# Patient Record
Sex: MALE | Race: WHITE | Employment: UNEMPLOYED | URBAN - METROPOLITAN AREA
[De-identification: names, ages, dates, MRNs, and addresses within clinical notes are randomized per-mention and may not be internally consistent; named-entity substitution may affect disease eponyms.]

---

## 2017-01-12 ENCOUNTER — OFFICE VISIT (OUTPATIENT)
Dept: PAIN MANAGEMENT | Age: 42
End: 2017-01-12

## 2017-01-12 VITALS — DIASTOLIC BLOOD PRESSURE: 99 MMHG | SYSTOLIC BLOOD PRESSURE: 145 MMHG | HEART RATE: 81 BPM | RESPIRATION RATE: 17 BRPM

## 2017-01-12 DIAGNOSIS — M79.604 PAIN IN BOTH LOWER EXTREMITIES: ICD-10-CM

## 2017-01-12 DIAGNOSIS — G57.72 COMPLEX REGIONAL PAIN SYNDROME TYPE 2 OF LEFT LOWER EXTREMITY: ICD-10-CM

## 2017-01-12 DIAGNOSIS — Z79.899 ENCOUNTER FOR LONG-TERM (CURRENT) DRUG USE: ICD-10-CM

## 2017-01-12 DIAGNOSIS — M79.605 PAIN IN BOTH LOWER EXTREMITIES: ICD-10-CM

## 2017-01-12 DIAGNOSIS — R20.9 DISTURBANCE OF SKIN SENSATION: ICD-10-CM

## 2017-01-12 DIAGNOSIS — Z79.899 ENCOUNTER FOR LONG-TERM (CURRENT) USE OF MEDICATIONS: Primary | ICD-10-CM

## 2017-01-12 LAB
ALCOHOL UR POC: NORMAL
AMPHETAMINES UR POC: NEGATIVE
BARBITURATES UR POC: NEGATIVE
BENZODIAZEPINES UR POC: NEGATIVE
BUPRENORPHINE UR POC: NORMAL
CANNABINOIDS UR POC: NEGATIVE
CARISOPRODOL UR POC: NORMAL
COCAINE UR POC: NEGATIVE
FENTANYL UR POC: NORMAL
MDMA/ECSTASY UR POC: NEGATIVE
METHADONE UR POC: NEGATIVE
METHAMPHETAMINE UR POC: NEGATIVE
METHYLPHENIDATE UR POC: NEGATIVE
OPIATES UR POC: NEGATIVE
OXYCODONE UR POC: NEGATIVE
PHENCYCLIDINE UR POC: NEGATIVE
PROPOXYPHENE UR POC: NORMAL
TRAMADOL UR POC: NORMAL
TRICYCLICS UR POC: NEGATIVE

## 2017-01-12 RX ORDER — OXYMORPHONE HYDROCHLORIDE 30 MG/1
30 TABLET, FILM COATED, EXTENDED RELEASE ORAL EVERY 12 HOURS
Qty: 60 TAB | Refills: 0 | Status: SHIPPED | OUTPATIENT
Start: 2017-01-12 | End: 2017-01-12 | Stop reason: SDUPTHER

## 2017-01-12 RX ORDER — OXYMORPHONE HYDROCHLORIDE 10 MG/1
10-20 TABLET ORAL
Qty: 180 TAB | Refills: 0 | Status: SHIPPED | OUTPATIENT
Start: 2017-02-12 | End: 2017-03-20 | Stop reason: SDUPTHER

## 2017-01-12 RX ORDER — OXYMORPHONE HYDROCHLORIDE 10 MG/1
10-20 TABLET ORAL
Qty: 180 TAB | Refills: 0 | Status: SHIPPED | OUTPATIENT
Start: 2017-01-14 | End: 2017-01-12 | Stop reason: SDUPTHER

## 2017-01-12 RX ORDER — OXYMORPHONE HYDROCHLORIDE 30 MG/1
30 TABLET, FILM COATED, EXTENDED RELEASE ORAL EVERY 12 HOURS
Qty: 60 TAB | Refills: 0 | Status: SHIPPED | OUTPATIENT
Start: 2017-02-10 | End: 2017-03-12

## 2017-01-12 NOTE — PROGRESS NOTES
Nursing Notes    Patient presents to the office today in follow-up. Reviewed medications with counts as follows:    Rx Date filled Qty Dispensed Pill Count Last Dose Short   Oxymorphone 10 mg 12/16/16 180 9 Today. 5 tabs no   Oxymorphone er  12/16/16 60 0 today Yes. 5 tabs   Mr. Annalise Westfall has a reminder for a \"due or due soon\" health maintenance. I have asked that he contact his primary care provider for follow-up on this health maintenance. One day period is included in pill count. POC UDS was performed in office today. Preliminary UDS (-) for everything. Reports doses taken today. Any new labs or imaging since last appointment? NO    Have you been to an emergency room (ER) or urgent care clinic since your last visit? NO            Have you been hospitalized since your last visit? NO     If yes, where, when, and reason for visit? Have you seen or consulted any other health care providers outside of the [address] [place] [address]  since your last visit? NO     If yes, where, when, and reason for visit?

## 2017-01-12 NOTE — PATIENT INSTRUCTIONS
Plan:  Continue same medications as prescribed for chronic pain  Please do not tamper with extended release medications (Opana ER)  Do not self increase medications for any reason!  Failure to comply may result in discharge from the practice  Follow up in 2 months with  [facility]  Regular exercise and attention to emotional health and diet remain the most effective ways to treat chronic pain of all kinds  You may contact me with questions or concerns through [address]

## 2017-01-12 NOTE — PROGRESS NOTES
HISTORY OF PRESENT ILLNESS  Veronica Garcia is a 37 y.o. male. 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 Mr. Segundo Mina reports that foot pain levels have been stable since his last visit. He describes his left foot pain as throbbing, burning, tingling, and exquisitely tender to touch. Pain worsens considerably with prolonged walking, particularly on the calcaneous and forefoot, where thin-skin grafts have not healed satisfactorily over the past several years. He may undergo plastic and podiatric surgeries later this winter to repair these areas with skin and fat grafting to adequately pad the plantar aspect of the foot. Pt reports average of 4/10 pain scale most days. We discussed that he is again short on his medications, and also discussed his recent abberrencies on his last two UDS (abnormal urine concentration indicated possible dilution with water and positive amphetamines). This is a concerning pattern, as they have been accelerating over the past ten months. He denies any difficulty with controlling how he takes his medications, and denies taking any medications not prescribed by our practice. If his current doses are not adequately managing his symptoms, we need to consider rotating to a different class. He is apologetic and will make effort to take medications as prescribed. A total of 40 minutes was spent with the patient of which more than 50% of the time was spent counseling the patient. HPI--see above    ROS  Constitutional: Negative for fever, chills, weight loss and malaise/fatigue. HENT: Negative for congestion, sore throat and neck pain. Eyes: Negative for blurred vision and double vision. Respiratory: Negative for cough and shortness of breath. Smoker-almost 1 ppd   Cardiovascular: Negative for chest pain and leg swelling. Gastrointestinal: Positive for constipation. Negative for heartburn, nausea, vomiting and diarrhea. Genitourinary: Negative. Musculoskeletal: Negative for myalgias, back pain, joint pain and falls. Skin: Negative. Neurological: Positive for sensory change (burning left foot). Negative for dizziness, tingling, tremors, seizures and headaches. Difficulty walking   Physical Exam  Musculoskeletal:        Right wrist: He exhibits decreased range of motion, tenderness and swelling. He exhibits no effusion and no deformity. Swelling and tenderness overlying the extensor pollicis longus of the right hand. positive Finkelstein's maneuver on right      Constitutional: He is oriented to person, place, and time. He appears well-developed and well-nourished. No distress. HENT:   Head: Normocephalic. Right Ear: External ear normal.   Left Ear: External ear normal.   Eyes: Conjunctivae and EOM are normal. Pupils are equal, round, and reactive to light. Neck: Normal range of motion. Pulmonary/Chest: Effort normal. No respiratory distress. Musculoskeletal: Normal range of motion. Left foot: He exhibits tenderness and deformity. Feet:    Macerated area to bottom heel of left foot and just under first left toe (improved). No evidence of infection (advised to monitor)   Neurological: He is alert and oriented to person, place, and time. Gait (antalgic) abnormal.   Skin: Skin is warm and dry. Psychiatric: He has a normal mood and affect. His behavior is normal. Judgment and thought content normal.   ASSESSMENT and PLAN    ICD-10-CM ICD-9-CM    1. Encounter for long-term (current) use of medications Z79.899 V58.69 DRUG SCREEN      AMB POC DRUG SCREEN ()   2. Encounter for long-term (current) drug use Z79.899 V58.69    3. Complex regional pain syndrome type 2 of left lower extremity G57.72 355.71    4. Disturbance of skin sensation R20.9 782.0    5.  Pain in both lower extremities M79.604 729.5     M79.605        Plan:  Continue same medications as prescribed for chronic pain  Please do not tamper with extended release medications (Opana ER)  Do not self increase medications for any reason!  Failure to comply may result in discharge from the practice  Follow up in 2 months with Dr. Marvin Moya  Regular exercise and attention to emotional health and diet remain the most effective ways to treat chronic pain of all kinds  You may contact me with questions or concerns through [address]

## 2017-03-20 ENCOUNTER — OFFICE VISIT (OUTPATIENT)
Dept: PAIN MANAGEMENT | Age: 42
End: 2017-03-20

## 2017-03-20 VITALS
BODY MASS INDEX: 23.98 KG/M2 | SYSTOLIC BLOOD PRESSURE: 120 MMHG | HEIGHT: 72 IN | DIASTOLIC BLOOD PRESSURE: 79 MMHG | HEART RATE: 96 BPM | WEIGHT: 177 LBS

## 2017-03-20 DIAGNOSIS — Z79.899 ENCOUNTER FOR LONG-TERM (CURRENT) DRUG USE: ICD-10-CM

## 2017-03-20 DIAGNOSIS — M79.605 PAIN IN BOTH LOWER EXTREMITIES: ICD-10-CM

## 2017-03-20 DIAGNOSIS — M79.604 PAIN IN BOTH LOWER EXTREMITIES: ICD-10-CM

## 2017-03-20 DIAGNOSIS — G57.72 COMPLEX REGIONAL PAIN SYNDROME TYPE 2 OF LEFT LOWER EXTREMITY: Primary | ICD-10-CM

## 2017-03-20 DIAGNOSIS — G89.28 OTHER CHRONIC POSTOPERATIVE PAIN: ICD-10-CM

## 2017-03-20 DIAGNOSIS — K59.03 CONSTIPATION DUE TO PAIN MEDICATION: ICD-10-CM

## 2017-03-20 DIAGNOSIS — G89.4 CHRONIC PAIN SYNDROME: ICD-10-CM

## 2017-03-20 RX ORDER — OXYMORPHONE HYDROCHLORIDE 10 MG/1
10-20 TABLET ORAL
Qty: 180 TAB | Refills: 0 | Status: SHIPPED | OUTPATIENT
Start: 2017-03-20 | End: 2017-05-19 | Stop reason: SDUPTHER

## 2017-03-20 RX ORDER — OXYMORPHONE HYDROCHLORIDE 10 MG/1
10-20 TABLET ORAL
Qty: 180 TAB | Refills: 0 | Status: SHIPPED | OUTPATIENT
Start: 2017-03-20 | End: 2017-04-19

## 2017-03-20 RX ORDER — OXYMORPHONE HYDROCHLORIDE 30 MG/1
30 TABLET, FILM COATED, EXTENDED RELEASE ORAL EVERY 12 HOURS
Qty: 60 TAB | Refills: 0 | Status: CANCELLED | OUTPATIENT
Start: 2017-03-20 | End: 2017-04-19

## 2017-03-20 NOTE — MR AVS SNAPSHOT
Visit Information Date & Time Provider Department Dept. Phone Encounter #  
 3/20/2017  9:40 AM Justinjosephine Vick [facility] [phone] [id number] Follow-up Instructions Return in about 2 months (around 5/20/2017). Upcoming Health Maintenance Date Due Pneumococcal 19-64 Medium Risk (1 of 1 - PPSV23) 4/26/1995 DTaP/Tdap/Td series (1 - Tdap) 4/26/1997 INFLUENZA AGE 9 TO ADULT 8/1/2016 Allergies as of 3/20/2017  Review Complete On: 3/20/2017 By: Breana Keene LPN Severity Noted Reaction Type Reactions Penicillins  04/26/2013    Other (comments) As a child ,throat swells Current Immunizations  Never Reviewed No immunizations on file. Not reviewed this visit You Were Diagnosed With   
  
 Codes Comments Complex regional pain syndrome type 2 of left lower extremity    -  Primary ICD-10-CM: G57.72 
ICD-9-CM: 355.71 Encounter for long-term (current) drug use     ICD-10-CM: Z79.899 ICD-9-CM: V58.69 Other chronic postoperative pain     ICD-10-CM: G89.28 ICD-9-CM: 338.28 Constipation due to pain medication     ICD-10-CM: K59.03 
ICD-9-CM: 564.09, E947.9 Pain in both lower extremities     ICD-10-CM: M79.604, M79.605 ICD-9-CM: 729.5 Chronic pain syndrome     ICD-10-CM: G89.4 ICD-9-CM: 338. 4 Vitals BP Pulse Height(growth percentile) Weight(growth percentile) BMI Smoking Status 120/79 96 6' (1.829 m) 177 lb (80.3 kg) 24.01 kg/m2 Current Some Day Smoker Vitals History BMI and BSA Data Body Mass Index Body Surface Area 24.01 kg/m 2 2.02 m 2 Preferred Pharmacy Pharmacy Name Phone [address] [facility] [phone] Your Updated Medication List  
  
   
This list is accurate as of: 3/20/17 11:00 AM.  Always use your most recent med list.  
  
  
  
  
 aspirin 325 mg tablet Commonly known as:  ASPIRIN Take 325 mg by mouth daily. linaclotide 145 mcg Cap capsule Commonly known as:  Enmanuel Iba Take 1 Cap by mouth Daily (before breakfast). naloxegol 25 mg Tab tablet Commonly known as:  Tequila Plan Take 1 Tab by mouth daily. For opioid induced constipation * oxyMORphone 10 mg Tab tablet Commonly known as:  OPANA Take 1-2 Tabs by mouth four (4) times daily as needed for Pain for up to 30 days. Max Daily Amount: 6 Tabs. For breakthrough pain. Due to fill 03/20/17  Indications: Pain * oxyMORphone 10 mg Tab tablet Commonly known as:  OPANA Take 1-2 Tabs by mouth four (4) times daily as needed for Pain for up to 30 days. Max Daily Amount: 6 Tabs. For breakthrough pain. Due to fill 04/18/17  Indications: Pain * oxyMORphone 20 mg ER tablet Commonly known as:  OPANA ER Take 1 Tab by mouth every twelve (12) hours for 10 days. Max Daily Amount: 40 mg. for chronic, severe, refractory pain * oxyMORphone 30 mg  
Commonly known as:  OPANA ER Take 1 Tab by mouth every twelve (12) hours. Max Daily Amount: 2 Tabs. for chronic, severe, refractory pain Start taking on:  4/18/2017 * Notice: This list has 4 medication(s) that are the same as other medications prescribed for you. Read the directions carefully, and ask your doctor or other care provider to review them with you. Prescriptions Printed Refills  
 oxyMORphone (OPANA) 10 mg tab tablet 0 Sig: Take 1-2 Tabs by mouth four (4) times daily as needed for Pain for up to 30 days. Max Daily Amount: 6 Tabs. For breakthrough pain. Due to fill 03/20/17  Indications: Pain Class: Print Route: Oral  
 oxyMORphone (OPANA) 10 mg tab tablet 0 Sig: Take 1-2 Tabs by mouth four (4) times daily as needed for Pain for up to 30 days. Max Daily Amount: 6 Tabs. For breakthrough pain. Due to fill 04/18/17  Indications: Pain Class: Print  Route: Oral  
 oxyMORphone (OPANA ER) 20 mg PO ER tablet 0 Sig: Take 1 Tab by mouth every twelve (12) hours for 10 days. Max Daily Amount: 40 mg. for chronic, severe, refractory pain  
 Class: Print Route: Oral  
 oxyMORphone (OPANA ER) 30 mg PO 0 Starting on: 4/18/2017 Sig: Take 1 Tab by mouth every twelve (12) hours. Max Daily Amount: 2 Tabs. for chronic, severe, refractory pain  
 Class: Print Route: Oral  
  
Follow-up Instructions Return in about 2 months (around 5/20/2017). Patient Instructions Plan: 
Restart Opana ER at 20 mg twice daily for ten days, then fill RX for Opana ER 30 mg twice daily for chronic pain Opana 10 mg will be restarted as needed for breakthrough pain--max of four per day Follow up in 3 months or sooner if needed Regular exercise and attention to emotional health and diet remain the most effective ways to treat chronic pain of all kinds You may contact me with questions or concerns through [address] Introducing [facility]! Lorraine Longoria introduces [employer] patient portal. Now you can access parts of your medical record, email your doctor's office, and request medication refills online. 1. In your internet browser, go to [email or web address] 2. Click on the First Time User? Click Here link in the Sign In box. You will see the New Member Sign Up page. 3. Enter your [employer] Access Code exactly as it appears below. You will not need to use this code after youve completed the sign-up process. If you do not sign up before the expiration date, you must request a new code. · [employer] Access Code: [email or web address] Expires: 6/18/2017 11:00 AM 
 
4. Enter the last four digits of your Social Security Number (xxxx) and Date of Birth (mm/dd/yyyy) as indicated and click Submit. You will be taken to the next sign-up page. 5. Create a In[employer] ID.  This will be your [employer] login ID and cannot be changed, so think of one that is secure and easy to remember. 6. Create a Roadster password. You can change your password at any time. 7. Enter your Password Reset Question and Answer. This can be used at a later time if you forget your password. 8. Enter your e-mail address. You will receive e-mail notification when new information is available in [address] 9. Click Sign Up. You can now view and download portions of your medical record. 10. Click the Download Summary menu link to download a portable copy of your medical information. If you have questions, please visit the Frequently Asked Questions section of the Road[employer] website. Remember, [employer] is NOT to be used for urgent needs. For medical emergencies, dial 911. Now available from your iPhone and Android! Please provide this summary of care documentation to your next provider. Your primary care clinician is listed as April Zeng. If you have any questions after today's visit, please call [phone].

## 2017-03-20 NOTE — PROGRESS NOTES
Nursing Notes    Patient presents to the office today in follow-up. Patient rates his pain at 5/10 on the numerical pain scale. Reviewed medications with counts as follows:    Rx Date filled Qty Dispensed Pill Count Last Dose Short   opana 10 mg  02/16/17 180 0 03/17/17 no   opana ER 30 mg  02/14/17 per  60 0 03/15-03/16                            POC UDS was not performed in office today    Any new labs or imaging since last appointment? NO    Have you been to an emergency room (ER) or urgent care clinic since your last visit? NO            Have you been hospitalized since your last visit? NO     If yes, where, when, and reason for visit? Have you seen or consulted any other health care providers outside of the [address] [place] [address]  since your last visit? NO     If yes, where, when, and reason for visit? HM deferred to pcp.

## 2017-03-20 NOTE — PROGRESS NOTES
HISTORY OF PRESENT ILLNESS  Paulina Lorenzo is a 37 y.o. male. 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 He reports that his pain has been much worse over the past four days since funning out of Opana ER 30 mg on 3/15/17. He was not able to get an appointment until today due to being in financial arrears with [employer]. He has \"stretched out\" his remaining breakthrough doses of Opana 10 mg, taking his last pill this morning. He reports that he took four tablets in the preceding days since running out of Opana ER (40 mg per day average). We will have to titrate back to his prior dose of 30 mg slowly--see treatment plan for details. He describes his left foot pain as throbbing, burning, tingling, and exquisitely tender to touch. Pain worsens considerably with prolonged walking, particularly on the calcaneous and forefoot, where thin-skin grafts have not healed satisfactorily over the past several years. He may undergo plastic and podiatric surgeries later this winter to repair these areas with skin and fat grafting to adequately pad the plantar aspect of the foot. Pt reports average of 4/10 pain scale most days. When he was taking his medications, he reports that his erlin control was very good, and his regimen well-tolerated. Paulina Lorenzo is tolerating medications well, with no untoward side effects noted. He is able to stay more active with less discomfort with these current doses. The patient reports an average of 70-80 % relief with this regimen. Most recent UDS and  were consistent with prescribed medications. Pill counts are appropriate.  He is informed of side effects, risks, and benefits of this regimen, and emphasizes that he derives a significant improvement in functionality and quality of life, and notes that non-opioid medications and therapies in the past have not offered significant benefit. He denies new or worsening insomnia or constipation issues. He denies any falls, injuries, or hospitalizations since the last visit. HPI--see above    ROS  Constitutional: Negative for fever, chills, weight loss and malaise/fatigue. HENT: Negative for congestion, sore throat and neck pain. Eyes: Negative for blurred vision and double vision. Respiratory: Negative for cough and shortness of breath. Smoker-almost 1 ppd   Cardiovascular: Negative for chest pain and leg swelling. Gastrointestinal: Positive for constipation. Negative for heartburn, nausea, vomiting and diarrhea. Genitourinary: Negative. Musculoskeletal: Negative for myalgias, back pain, joint pain and falls. Skin: Negative. Neurological: Positive for sensory change (burning left foot). Negative for dizziness, tingling, tremors, seizures and headaches. Difficulty walking   Physical Exam  Musculoskeletal:        Right wrist: He exhibits decreased range of motion, tenderness and swelling. He exhibits no effusion and no deformity. Swelling and tenderness overlying the extensor pollicis longus of the right hand. positive Finkelstein's maneuver on right      Constitutional: He is oriented to person, place, and time. He appears well-developed and well-nourished. No distress. HENT:   Head: Normocephalic. Right Ear: External ear normal.   Left Ear: External ear normal.   Eyes: Conjunctivae and EOM are normal. Pupils are equal, round, and reactive to light. Neck: Normal range of motion. Pulmonary/Chest: Effort normal. No respiratory distress. Musculoskeletal: Normal range of motion. Left foot: He exhibits tenderness and deformity. Feet:    )   Neurological: He is alert and oriented to person, place, and time. Gait (antalgic) abnormal.   Skin: Skin is warm and dry. Psychiatric: He has a normal mood and affect.  His behavior is normal. Judgment and thought content normal.   ASSESSMENT and PLAN    ICD-10-CM ICD-9-CM    1. Complex regional pain syndrome type 2 of left lower extremity G57.72 355.71    2. Encounter for long-term (current) drug use Z79.899 V58.69    3. Other chronic postoperative pain G89.28 338.28    4. Constipation due to pain medication K59.03 564.09      E947.9    5. Pain in both lower extremities M79.604 729.5     M79.605     6.  Chronic pain syndrome G89.4 338.4       Plan:  Restart Opana ER at 20 mg twice daily for ten days, then fill RX for Opana ER 30 mg twice daily for chronic pain  Opana 10 mg will be restarted as needed for breakthrough pain--max of six per day  Follow up in 2 months or sooner if needed  Regular exercise and attention to emotional health and diet remain the most effective ways to treat chronic pain of all kinds  You may contact me with questions or concerns through [facility]

## 2017-03-20 NOTE — PATIENT INSTRUCTIONS
Plan:  Restart Opana ER at 20 mg twice daily for ten days, then fill RX for Opana ER 30 mg twice daily for chronic pain  Opana 10 mg will be restarted as needed for breakthrough pain--max of four per day  Follow up in 3 months or sooner if needed  Regular exercise and attention to emotional health and diet remain the most effective ways to treat chronic pain of all kinds  You may contact me with questions or concerns through [facility]

## 2017-04-19 DIAGNOSIS — Z79.899 ENCOUNTER FOR LONG-TERM (CURRENT) DRUG USE: ICD-10-CM

## 2017-04-19 DIAGNOSIS — G89.4 CHRONIC PAIN SYNDROME: ICD-10-CM

## 2017-04-24 RX ORDER — LINACLOTIDE 145 UG/1
CAPSULE, GELATIN COATED ORAL
Qty: 30 CAP | Refills: 0 | Status: SHIPPED | OUTPATIENT
Start: 2017-04-24 | End: 2017-06-20 | Stop reason: SDUPTHER

## 2017-05-19 DIAGNOSIS — G89.4 CHRONIC PAIN SYNDROME: ICD-10-CM

## 2017-05-19 DIAGNOSIS — G57.72 COMPLEX REGIONAL PAIN SYNDROME TYPE 2 OF LEFT LOWER EXTREMITY: ICD-10-CM

## 2017-05-19 DIAGNOSIS — Z79.899 ENCOUNTER FOR LONG-TERM (CURRENT) DRUG USE: Primary | ICD-10-CM

## 2017-05-19 RX ORDER — OXYMORPHONE HYDROCHLORIDE 10 MG/1
10-20 TABLET ORAL
Qty: 180 TAB | Refills: 0 | Status: SHIPPED | OUTPATIENT
Start: 2017-05-19 | End: 2017-06-20 | Stop reason: SDUPTHER

## 2017-06-20 ENCOUNTER — OFFICE VISIT (OUTPATIENT)
Dept: PAIN MANAGEMENT | Age: 42
End: 2017-06-20

## 2017-06-20 VITALS
HEART RATE: 98 BPM | BODY MASS INDEX: 24.01 KG/M2 | SYSTOLIC BLOOD PRESSURE: 126 MMHG | WEIGHT: 177 LBS | DIASTOLIC BLOOD PRESSURE: 95 MMHG

## 2017-06-20 DIAGNOSIS — M79.604 PAIN IN BOTH LOWER EXTREMITIES: ICD-10-CM

## 2017-06-20 DIAGNOSIS — G89.4 CHRONIC PAIN SYNDROME: ICD-10-CM

## 2017-06-20 DIAGNOSIS — G57.72 COMPLEX REGIONAL PAIN SYNDROME TYPE 2 OF LEFT LOWER EXTREMITY: ICD-10-CM

## 2017-06-20 DIAGNOSIS — Z79.899 ENCOUNTER FOR LONG-TERM (CURRENT) DRUG USE: ICD-10-CM

## 2017-06-20 DIAGNOSIS — M79.605 PAIN IN BOTH LOWER EXTREMITIES: ICD-10-CM

## 2017-06-20 RX ORDER — GABAPENTIN 400 MG/1
400 CAPSULE ORAL 3 TIMES DAILY
Qty: 90 CAP | Refills: 0 | Status: SHIPPED | OUTPATIENT
Start: 2017-07-19 | End: 2017-08-21 | Stop reason: SDUPTHER

## 2017-06-20 RX ORDER — OXYMORPHONE HYDROCHLORIDE 10 MG/1
10-20 TABLET ORAL
Qty: 180 TAB | Refills: 0 | Status: SHIPPED | OUTPATIENT
Start: 2017-07-19 | End: 2017-08-21 | Stop reason: SDUPTHER

## 2017-06-20 RX ORDER — GABAPENTIN 300 MG/1
300 CAPSULE ORAL 3 TIMES DAILY
Qty: 90 CAP | Refills: 0 | Status: SHIPPED | OUTPATIENT
Start: 2017-06-20 | End: 2017-07-20

## 2017-06-20 RX ORDER — OXYMORPHONE HYDROCHLORIDE 10 MG/1
10-20 TABLET ORAL
Qty: 180 TAB | Refills: 0 | Status: SHIPPED | OUTPATIENT
Start: 2017-06-20 | End: 2017-08-21 | Stop reason: SDUPTHER

## 2017-06-20 RX ORDER — NALOXONE HYDROCHLORIDE 4 MG/.1ML
4 SPRAY NASAL AS NEEDED
Qty: 1 PACKAGE | Refills: 0 | Status: SHIPPED | OUTPATIENT
Start: 2017-06-20
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
## 2017-06-20 NOTE — PROGRESS NOTES
Nursing Notes    Patient presents to the office today in follow-up. Patient rates his pain at 7/10 on the numerical pain scale. Reviewed medications with counts as follows:    Rx Date filled Qty Dispensed Pill Count Last Dose Short   Oxymorphone 30mg ER 05/19/17 60 0 This am  No    Oxymorphone 10mg  05/19/17 180 0 Last night  No                                     Comments:     POC UDS was not performed in office today    Any new labs or imaging since last appointment? NO    Have you been to an emergency room (ER) or urgent care clinic since your last visit? NO            Have you been hospitalized since your last visit? NO     If yes, where, when, and reason for visit? Have you seen or consulted any other health care providers outside of the [address] [place] [address]  since your last visit? NO     If yes, where, when, and reason for visit? HM deferred to pcp.

## 2017-06-20 NOTE — PATIENT INSTRUCTIONS
1. Continue current plan with no evidence of addiction or diversion. Stable on current medication without adverse events. 2. Refill Opana IR 10 mg. Take 1-2 tablets up to 4 times daily as needed for pain. 3. Refill Opana ER 30 mg every 12 hours  4. Restart gabapentin 300 mg every 8 hours. Second prescription for next month adjusted to 400 mg every 8 hours. 5. Refill Linzess 145 mcg once daily with breakfast.   6. Discussed risks of addiction, dependency, and opioid induced hyperalgesia. 7. Return to clinic in 2 months with Ernie Sosa.

## 2017-06-20 NOTE — MR AVS SNAPSHOT
Visit Information Date & Time Provider Department Dept. Phone Encounter #  
 6/20/2017  2:20 PM Herminia Prader, [facility] [phone] Follow-up Instructions Return in about 2 months (around 8/20/2017). Upcoming Health Maintenance Date Due Pneumococcal 19-64 Medium Risk (1 of 1 - PPSV23) 4/26/1995 DTaP/Tdap/Td series (1 - Tdap) 4/26/1997 INFLUENZA AGE 9 TO ADULT 8/1/2017 Allergies as of 6/20/2017  Review Complete On: 6/20/2017 By: Herminia Prader, PA Severity Noted Reaction Type Reactions Penicillins  04/26/2013    Other (comments) As a child ,throat swells Current Immunizations  Never Reviewed No immunizations on file. Not reviewed this visit You Were Diagnosed With   
  
 Codes Comments Complex regional pain syndrome type 2 of left lower extremity     ICD-10-CM: G57.72 
ICD-9-CM: 355.71 Chronic pain syndrome     ICD-10-CM: G89.4 ICD-9-CM: 338.4 Pain in both lower extremities     ICD-10-CM: M79.604, M79.605 ICD-9-CM: 729.5 Encounter for long-term (current) drug use     ICD-10-CM: Z79.899 ICD-9-CM: V58.69 Vitals BP Pulse Weight(growth percentile) BMI Smoking Status (!) 126/95 98 177 lb (80.3 kg) 24.01 kg/m2 Current Some Day Smoker BMI and BSA Data Body Mass Index Body Surface Area 24.01 kg/m 2 2.02 m 2 Preferred Pharmacy Pharmacy Name [facility] [phone] [phone] Your Updated Medication List  
  
   
This list is accurate as of: 6/20/17  3:15 PM.  Always use your most recent med list.  
  
  
  
  
 aspirin 325 mg tablet Commonly known as:  ASPIRIN Take 325 mg by mouth daily. * gabapentin 300 mg capsule Commonly known as:  NEURONTIN Take 1 Cap by mouth three (3) times daily for 30 days. * gabapentin 400 mg capsule Commonly known as:  NEURONTIN Take 1 Cap by mouth three (3) times daily for 30 days. Start taking on:  7/19/2017  
  
 linaclotide 145 mcg Cap capsule Commonly known as:  Jaya Eaves Take 1 Cap by mouth daily (with breakfast) for 30 days. naloxegol 25 mg Tab tablet Commonly known as:  Cindia Velazquez Take 1 Tab by mouth daily. For opioid induced constipation  
  
 naloxone 4 mg/actuation Spry 4 mg by Nasal route as needed. As needed for opioid induced respiratory emergency only  Indications: OPIATE-INDUCED RESPIRATORY DEPRESSION  
  
 * oxyMORphone 30 mg  
Commonly known as:  OPANA ER Take 1 Tab by mouth every twelve (12) hours for 30 days. Max Daily Amount: 2 Tabs. for chronic, severe, refractory pain * oxyMORphone 10 mg Tab tablet Commonly known as:  OPANA Take 1-2 Tabs by mouth four (4) times daily as needed for Pain for up to 30 days. Max Daily Amount: 6 Tabs. For breakthrough pain. Indications: Pain * oxyMORphone 30 mg  
Commonly known as:  OPANA ER Take 1 Tab by mouth every twelve (12) hours. Max Daily Amount: 2 Tabs. for chronic, severe, refractory pain Start taking on:  7/19/2017 * oxyMORphone 10 mg Tab tablet Commonly known as:  OPANA Take 1-2 Tabs by mouth four (4) times daily as needed for Pain for up to 30 days. Max Daily Amount: 6 Tabs. For breakthrough pain. Indications: Pain Start taking on:  7/19/2017 * Notice: This list has 6 medication(s) that are the same as other medications prescribed for you. Read the directions carefully, and ask your doctor or other care provider to review them with you. Prescriptions Printed Refills  
 oxyMORphone (OPANA ER) 30 mg PO 0 Sig: Take 1 Tab by mouth every twelve (12) hours for 30 days. Max Daily Amount: 2 Tabs. for chronic, severe, refractory pain  
 Class: Print Route: Oral  
 oxyMORphone (OPANA ER) 30 mg PO 0 Starting on: 7/19/2017 Sig: Take 1 Tab by mouth every twelve (12) hours. Max Daily Amount: 2 Tabs. for chronic, severe, refractory pain  
 Class: Print Route: Oral  
 oxyMORphone (OPANA) 10 mg tab tablet 0 Sig: Take 1-2 Tabs by mouth four (4) times daily as needed for Pain for up to 30 days. Max Daily Amount: 6 Tabs. For breakthrough pain. Indications: Pain Class: Print Route: Oral  
 oxyMORphone (OPANA) 10 mg tab tablet 0 Starting on: 2017 Sig: Take 1-2 Tabs by mouth four (4) times daily as needed for Pain for up to 30 days. Max Daily Amount: 6 Tabs. For breakthrough pain. Indications: Pain Class: Print Route: Oral  
 naloxone 4 mg/actuation spry 0 Si mg by Nasal route as needed. As needed for opioid induced respiratory emergency only  Indications: OPIATE-INDUCED RESPIRATORY DEPRESSION Class: Print Route: Nasal  
  
Prescriptions Sent to Pharmacy Refills  
 linaclotide (LINZESS) 145 mcg cap capsule 3 Sig: Take 1 Cap by mouth daily (with breakfast) for 30 days. Class: Normal  
 Pharmacy: [address] [place] [address] Ph #: [phone] Route: Oral  
 gabapentin (NEURONTIN) 300 mg capsule 0 Sig: Take 1 Cap by mouth three (3) times daily for 30 days. Class: Normal  
 Pharmacy: [address] [place] [address] Ph #: [phone] Route: Oral  
 gabapentin (NEURONTIN) 400 mg capsule 0 Starting on: 2017 Sig: Take 1 Cap by mouth three (3) times daily for 30 days. Class: Normal  
 Pharmacy: [address] [place] [address] Ph #: [phone] Route: Oral  
  
Follow-up Instructions Return in about 2 months (around 2017). Patient Instructions 1. Continue current plan with no evidence of addiction or diversion. Stable on current medication without adverse events. 2. Refill Opana IR 10 mg. Take 1-2 tablets up to 4 times daily as needed for pain. 3. Refill Opana ER 30 mg every 12 hours 4. Restart gabapentin 300 mg every 8 hours. Second prescription for next month adjusted to 400 mg every 8 hours. 5. Refill Linzess 145 mcg once daily with breakfast.  
6. Discussed risks of addiction, dependency, and opioid induced hyperalgesia. 7. Return to clinic in 2 months with Suzie Boxer. Introducing [facility]! Yana Ortega introduces [employer] patient portal. Now you can access parts of your medical record, email your doctor's office, and request medication refills online. 1. In your internet browser, go to [email or web address] 2. Click on the First Time User? Click Here link in the Sign In box. You will see the New Member Sign Up page. 3. Enter your [employer] Access Code exactly as it appears below. You will not need to use this code after youve completed the sign-up process. If you do not sign up before the expiration date, you must request a new code. · [employer] Access Code: [email or web address] Expires: 9/18/2017  3:15 PM 
 
4. Enter the last four digits of your Social Security Number (xxxx) and Date of Birth (mm/dd/yyyy) as indicated and click Submit. You will be taken to the next sign-up page. 5. Create a [employer] ID. This will be your [employer] login ID and cannot be changed, so think of one that is secure and easy to remember. 6. Create a [employer] password. You can change your password at any time. 7. Enter your Password Reset Question and Answer. This can be used at a later time if you forget your password. 8. Enter your e-mail address. You will receive e-mail notification when new information is available in [address] 9. Click Sign Up. You can now view and download portions of your medical record. 10. Click the Download Summary menu link to download a portable copy of your medical information. If you have questions, please visit the Frequently Asked Questions section of the [employer] website. Remember, [employer] is NOT to be used for urgent needs. For medical emergencies, dial 911. Now available from your iPhone and Android! Please provide this summary of care documentation to your next provider. Your primary care clinician is listed as Emily Bartholomew. If you have any questions after today's visit, please call [phone].

## 2017-08-21 ENCOUNTER — OFFICE VISIT (OUTPATIENT)
Dept: PAIN MANAGEMENT | Age: 42
End: 2017-08-21

## 2017-08-21 VITALS
WEIGHT: 177 LBS | HEART RATE: 81 BPM | SYSTOLIC BLOOD PRESSURE: 132 MMHG | RESPIRATION RATE: 18 BRPM | DIASTOLIC BLOOD PRESSURE: 90 MMHG | HEIGHT: 72 IN | TEMPERATURE: 97.3 F | BODY MASS INDEX: 23.98 KG/M2

## 2017-08-21 DIAGNOSIS — Z79.899 ENCOUNTER FOR LONG-TERM (CURRENT) DRUG USE: ICD-10-CM

## 2017-08-21 DIAGNOSIS — Z79.899 ENCOUNTER FOR LONG-TERM (CURRENT) USE OF HIGH-RISK MEDICATION: ICD-10-CM

## 2017-08-21 DIAGNOSIS — G89.4 CHRONIC PAIN SYNDROME: ICD-10-CM

## 2017-08-21 DIAGNOSIS — G89.28 OTHER CHRONIC POSTOPERATIVE PAIN: ICD-10-CM

## 2017-08-21 DIAGNOSIS — M79.605 PAIN OF LEFT LOWER EXTREMITY: Primary | ICD-10-CM

## 2017-08-21 DIAGNOSIS — K59.03 CONSTIPATION DUE TO PAIN MEDICATION: ICD-10-CM

## 2017-08-21 DIAGNOSIS — R20.9 DISTURBANCE OF SKIN SENSATION: ICD-10-CM

## 2017-08-21 LAB
ALCOHOL UR POC: NORMAL
AMPHETAMINES UR POC: NORMAL
BARBITURATES UR POC: NORMAL
BENZODIAZEPINES UR POC: NORMAL
BUPRENORPHINE UR POC: NORMAL
CANNABINOIDS UR POC: NORMAL
CARISOPRODOL UR POC: NORMAL
COCAINE UR POC: NORMAL
FENTANYL UR POC: NORMAL
MDMA/ECSTASY UR POC: NORMAL
METHADONE UR POC: NORMAL
METHAMPHETAMINE UR POC: NORMAL
METHYLPHENIDATE UR POC: NORMAL
OPIATES UR POC: NORMAL
OXYCODONE UR POC: NORMAL
PHENCYCLIDINE UR POC: NORMAL
PROPOXYPHENE UR POC: NORMAL
TRAMADOL UR POC: NORMAL
TRICYCLICS UR POC: NORMAL

## 2017-08-21 RX ORDER — OXYMORPHONE HYDROCHLORIDE 10 MG/1
10-20 TABLET ORAL
Qty: 180 TAB | Refills: 0 | Status: SHIPPED | OUTPATIENT
Start: 2017-09-20 | End: 2017-10-20 | Stop reason: SDUPTHER

## 2017-08-21 RX ORDER — OXYMORPHONE HYDROCHLORIDE 10 MG/1
10-20 TABLET ORAL
Qty: 180 TAB | Refills: 0 | Status: SHIPPED | OUTPATIENT
Start: 2017-08-21 | End: 2017-09-20

## 2017-08-21 RX ORDER — GABAPENTIN 400 MG/1
400 CAPSULE ORAL 3 TIMES DAILY
Qty: 90 CAP | Refills: 1 | Status: SHIPPED | OUTPATIENT
Start: 2017-08-22 | End: 2017-10-24 | Stop reason: SDUPTHER

## 2017-08-21 NOTE — PROGRESS NOTES
Nursing Notes    Patient presents to the office today in follow-up. Patient rates his pain at 2/10 on the numerical pain scale. Reviewed medications with counts as follows:    Rx Date filled Qty Dispensed Pill Count Last Dose Short   opana ER 30 mg 07/19/17 60 0 This a.m no   opana 10 mg  07/19/17 180 0 today no                            POC UDS was performed in office today    Any new labs or imaging since last appointment? NO    Have you been to an emergency room (ER) or urgent care clinic since your last visit? NO            Have you been hospitalized since your last visit? NO     If yes, where, when, and reason for visit? Have you seen or consulted any other health care providers outside of the [facility]  since your last visit? NO     If yes, where, when, and reason for visit? HM deferred to pcp.

## 2017-08-21 NOTE — PROGRESS NOTES
HISTORY OF PRESENT ILLNESS  Atul Velazquez is a 40 y.o. male    HPI: Mr. 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 He may undergo future plastic surgeries to repair these areas with skin and fat grafting to adequately pad the plantar aspect of the foot. Currently works as an  for [employer]      This is my first visit with Mr. Toby Bardales. He continues unchanged as last visit. We had a lengthy conversation today about his current condition and medications. He reports good relief since restarting gabapentin 400 mg every 8 hours. He tolerates medications without side effects. Atul Velazquez reports no change in sleep or constipation. I will have him follow-up in 2 months for further assessment. He is currently using Opana ER 30 mg BID and Opana IR 10 mg 1 - 2 tablets up to four times daily as needed, gabapentin 400 mg every 8 hours Linzess for constipation. Medications are helping with pain control and quality of life. His pain is 3-4/10 with medication and 6-7/10 without. Pt describes pain as constant, sharp and stabbing. Walking can aggravate the pain. The patient reports 70% relief with current medications. Current treatment is helping to improve general activity, mood, walking, sleep, enjoyment of life. He  is otherwise doing well with no other complaints today. He denies any adverse events including nausea, vomiting, dizziness, increased constipation, hallucinations, or seizures. Measuring clinical outcomes of chronic pain patients: score 5/28; the lower the number the better the outcome. Pain Meds and Quality Of Life have been reviewed. Nonpharmacologic therapy and non-opioid pharmacologic therapy were considered.   If opioid therapy is prescribed, this is only if the expected benefits are anticipated to outweigh risks. The patient reports functional improvement and QOL with pain medication. Vitals:    08/21/17 1553   BP: 132/90   Pulse: 81   Resp: 18   Temp: 97.3 °F (36.3 °C)   Weight: 80.3 kg (177 lb)   Height: 6' (1.829 m)   PainSc:   2   PainLoc: Foot         Allergies   Allergen Reactions    Penicillins Other (comments)     As a child ,throat swells       Past Surgical History:   Procedure Laterality Date    HX APPENDECTOMY           Review of Systems   Constitutional: Negative for chills, fever, malaise/fatigue and weight loss. HENT: Positive for congestion and sinus pain. Negative for ear discharge, ear pain, hearing loss and nosebleeds. Seasonal allergies   Eyes: Negative for blurred vision, double vision and pain. Respiratory: Negative for cough, shortness of breath and wheezing. Seasonal allergies  Childhood asthma   Cardiovascular: Negative for chest pain, palpitations and leg swelling. Gastrointestinal: Positive for constipation. Negative for abdominal pain, diarrhea, heartburn, nausea and vomiting. Relieved with Linzess   Genitourinary: Negative for dysuria, frequency and urgency. Musculoskeletal: Positive for myalgias. Negative for falls, joint pain and neck pain. Skin: Negative for itching and rash. Neurological: Negative for dizziness, tingling, tremors, seizures, loss of consciousness, weakness and headaches. Psychiatric/Behavioral: Negative for depression, hallucinations and suicidal ideas. The patient is nervous/anxious. The patient does not have insomnia. Physical Exam   Constitutional: He is oriented to person, place, and time and well-developed, well-nourished, and in no distress. He appears healthy. Non-toxic appearance. No distress. HENT:   Head: Normocephalic and atraumatic. Eyes: Right eye exhibits no discharge. Left eye exhibits no discharge. Neck: Normal range of motion. Neck supple.    Pulmonary/Chest: Effort normal.   Musculoskeletal: He exhibits tenderness and deformity. Left foot: There is decreased range of motion, tenderness and deformity. Left foot first two toes amputated due to frostbite   Neurological: He is alert and oriented to person, place, and time. Skin: Skin is warm and dry. He is not diaphoretic. Psychiatric: Mood and affect normal.   Nursing note and vitals reviewed. ASSESSMENT:    1. Pain of left lower extremity    2. Chronic pain syndrome    3. Disturbance of skin sensation    4. Other chronic postoperative pain    5. Constipation due to pain medication    6. Encounter for long-term (current) drug use          [place] Prescription Monitoring Program was reviewed which does not demonstrate aberrancies and/or inconsistencies with regard to the historical prescribing of controlled medications to this patient by other providers. Medications were brought to visit today. Pill count was appropriate. The patients condition and plan were discussed. All questions were answered. The patient agrees with the plan. PLAN / Pt Instructions:  1. Continue current plan with no evidence of addiction or diversion. Stable on current medication without adverse events. 2. Refill Opana IR 10 mg. Take 1-2 tablets up to 4 times daily as needed for pain. 3. Refill Opana ER 30 mg every 12 hours  4. Refill Gabapentin 400 mg every 8 hours. 5. Continue Linzess 145 mcg once daily with breakfast.   6. Discussed risks of addiction, dependency, and opioid induced hyperalgesia. 7. Return to clinic in 2 months       Medications Ordered Today   Medications    oxyMORphone (OPANA ER) 30 mg PO     Sig: Take 1 Tab by mouth every twelve (12) hours for 30 days. Max Daily Amount: 2 Tabs. for chronic, severe, refractory pain     Dispense:  60 Each     Refill:  0    oxyMORphone (OPANA) 10 mg tab tablet     Sig: Take 1-2 Tabs by mouth four (4) times daily as needed for Pain for up to 30 days.  Max Daily Amount: 6 Tabs. For breakthrough pain. Indications: Pain     Dispense:  180 Tab     Refill:  0    oxyMORphone (OPANA) 10 mg tab tablet     Sig: Take 1-2 Tabs by mouth four (4) times daily as needed for Pain for up to 30 days. Max Daily Amount: 6 Tabs. For breakthrough pain. Indications: Pain     Dispense:  180 Tab     Refill:  0    oxyMORphone (OPANA ER) 30 mg PO     Sig: Take 1 Tab by mouth every twelve (12) hours. Max Daily Amount: 2 Tabs. for chronic, severe, refractory pain     Dispense:  60 Each     Refill:  0    gabapentin (NEURONTIN) 400 mg capsule     Sig: Take 1 Cap by mouth three (3) times daily for 30 days. Dispense:  90 Cap     Refill:  1       Prescription monitoring program reviewed. POC UDS today. Pain medications prescribed with the objective of pain relief and improved physical and psychosocial function in this patient. DISPOSITION  · Counseled patient on proper use of prescribed medications. · Counseled patient about chronic medical conditions and their relationship to anxiety and depression and recommended mental health support as needed. · Reviewed with patient self-help tools, home exercise, and lifestyle changes to assist the patient in self-management of symptoms. · Reviewed with patient the treatment plan, goals of treatment plan, and limitations of treatment plan, to include the potential for side effects from medications and procedures. If side effects occur, it is the responsibility of the patient to inform the clinic so that a change in the treatment plan can be made in a safe manner. The patient is advised that stopping prescribed medication may cause an increase in symptoms and possible medication withdrawal symptoms. The patient is informed an emergency room evaluation may be necessary if this occurs.         Spent 25 minutes with patient today reviewing the treatment plan, goals of treatment plan, and limitations of the treatment plan, to include the potential for side effects from medications and procedures. Beatrice Whitfield PA-C 8/21/2017        Note: Please excuse any typographical errors. Voice recognition software was used for this note and may cause mistakes.

## 2017-08-21 NOTE — ACP (ADVANCE CARE PLANNING)
The pt does not have an advanced medical directive, POA, or living will. He is not interested in completing this today.

## 2017-08-21 NOTE — PATIENT INSTRUCTIONS
PLAN / Pt Instructions:  1. Continue current plan with no evidence of addiction or diversion. Stable on current medication without adverse events. 2. Refill Opana IR 10 mg. Take 1-2 tablets up to 4 times daily as needed for pain. 3. Refill Opana ER 30 mg every 12 hours  4. Refill Gabapentin 400 mg every 8 hours. 5. Continue Linzess 145 mcg once daily with breakfast.   6. Discussed risks of addiction, dependency, and opioid induced hyperalgesia.    7. Return to clinic in 2 months

## 2017-10-20 RX ORDER — OXYMORPHONE HYDROCHLORIDE 10 MG/1
10-20 TABLET ORAL
Qty: 24 TAB | Refills: 0 | Status: SHIPPED | OUTPATIENT
Start: 2017-10-20 | End: 2017-10-24 | Stop reason: SDUPTHER

## 2017-10-20 NOTE — TELEPHONE ENCOUNTER
The pt called the office about his meds. His appt was rescheduled to 10/23/17 and he will run out of meds before his appt. The pt is requesting enough meds to get to his appt. A  was obtained and there were no aberrancies noted. He last filled his oxycodone on 09/18/17 and his opana ER on 09/20/17. The pt's request will be sent over to a provider for review. It will be sent over to [facility] [place] [place]. This was the last provider to see the pt.  Pt will be called with the outcome of his request.

## 2017-10-24 ENCOUNTER — OFFICE VISIT (OUTPATIENT)
Dept: PAIN MANAGEMENT | Age: 42
End: 2017-10-24

## 2017-10-24 VITALS
DIASTOLIC BLOOD PRESSURE: 81 MMHG | SYSTOLIC BLOOD PRESSURE: 119 MMHG | WEIGHT: 182 LBS | BODY MASS INDEX: 24.65 KG/M2 | RESPIRATION RATE: 12 BRPM | TEMPERATURE: 97 F | HEIGHT: 72 IN | HEART RATE: 72 BPM

## 2017-10-24 DIAGNOSIS — G57.72 COMPLEX REGIONAL PAIN SYNDROME TYPE 2 OF LEFT LOWER EXTREMITY: Primary | ICD-10-CM

## 2017-10-24 DIAGNOSIS — Z79.899 ENCOUNTER FOR LONG-TERM (CURRENT) DRUG USE: ICD-10-CM

## 2017-10-24 DIAGNOSIS — G89.4 CHRONIC PAIN SYNDROME: ICD-10-CM

## 2017-10-24 RX ORDER — GABAPENTIN 400 MG/1
400 CAPSULE ORAL 3 TIMES DAILY
Qty: 90 CAP | Refills: 3 | Status: SHIPPED | OUTPATIENT
Start: 2017-10-24 | End: 2018-02-26 | Stop reason: SDUPTHER

## 2017-10-24 RX ORDER — OXYMORPHONE HYDROCHLORIDE 10 MG/1
10-20 TABLET ORAL
Qty: 180 TAB | Refills: 0 | Status: CANCELLED | OUTPATIENT
Start: 2017-10-24 | End: 2017-11-23

## 2017-10-24 RX ORDER — OXYMORPHONE HYDROCHLORIDE 10 MG/1
10 TABLET ORAL
Qty: 150 TAB | Refills: 0 | Status: SHIPPED | OUTPATIENT
Start: 2017-10-24 | End: 2017-11-22 | Stop reason: SDUPTHER

## 2017-10-24 RX ORDER — GABAPENTIN 400 MG/1
400 CAPSULE ORAL 3 TIMES DAILY
COMMUNITY

## 2017-10-24 NOTE — PATIENT INSTRUCTIONS
1. Continue current plan with no evidence of addiction or diversion. Stable on current medication without adverse events. 2. Refill Opana IR 10 mg. Taper down as follows. Take 1 tablet up to 5 times daily as needed for pain. Plan to taper down to 4 times daily next visit. 3. Refill Opana ER 30 mg every 12 hours  4. Refill gabapentin 300 mg every 8 hours. Second prescription for next month adjusted to 400 mg every 8 hours. 5. Refill Linzess 145 mcg once daily with breakfast.   6. naloxone 4 mg nasal spray for opioid induced respiratory depression emergency only. 7. Discussed risks of addiction, dependency, and opioid induced hyperalgesia.    8. Return to clinic in 1 month

## 2017-10-24 NOTE — PROGRESS NOTES
HISTORY OF PRESENT ILLNESS  Paulina Lorenzo is a 40 y.o. male. HPI Mr. 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 He may undergo future plastic surgeries to repair these areas with skin and fat grafting to adequately pad the plantar aspect of the foot. Currently works as an  for [employer]    He continues unchanged as last visit. We discussed his current condition medications in detail today. He reports some improvement with his treatment since his last visit with myself. He says that his current job situation has helped as well. 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 We had a very lengthy conversation about long-acting versus short acting medication in the past.  I strongly encouraged him to continue with his long-acting medication and uses short acting oxymorphone on an as-needed basis only. We will begin tapering his oxymorphone IR down to 1 tablet 5 times daily as needed. We will then taper down to 4 times daily as needed next visit. He did not bring in his medication bottles with him today. Extensive counseling was provided. I strongly encouraged him to bring his bottles with him to each and every visit even if his bottles are empty. He verbally agrees. I will have him follow-up in 1 month for further evaluation and recommendation. He is currently using Opana ER 30 mg BID and Opana IR 10 mg 1 - 2 tablets up to four times daily as needed. Linzess for constipation. Medications are helping with pain control and quality of life. His pain is 3-4/10 with medication and 6-7/10 without. Pt describes pain as constant, sharp and stabbing. Walking can aggravate the pain. Rest and medication alleviate the pain.    Current treatment is helping to improve general activity, mood, walking, sleep, enjoyment of life    Pt does report constipation but is well controlled Linzess  He is otherwise doing well with no other complaints today. He denies any adverse events including nausea, vomiting, dizziness, increased constipation, hallucinations, or seizures. Because the patient's current regimen places him/her at increased risk for possible overdose, a prescription for naloxone nasal spray has been provided. The patient understands that this medication is only to be used in the setting of a possible overdose and that inadvertent use of this medication could precipitate overt withdrawal.       Allergies   Allergen Reactions    Penicillins Other (comments)     As a child ,throat swells       Past Surgical History:   Procedure Laterality Date    HX APPENDECTOMY           Review of Systems   Constitutional: Negative for chills, fever, malaise/fatigue and weight loss. HENT: Negative for congestion and sore throat. Eyes: Negative for blurred vision and double vision. Respiratory: Negative for cough and wheezing. Cardiovascular: Negative for palpitations and leg swelling. Gastrointestinal: Positive for constipation. Negative for diarrhea, heartburn, nausea and vomiting. Genitourinary: Negative. Musculoskeletal: Negative for back pain, falls, joint pain, myalgias and neck pain. Skin: Negative. Neurological: Positive for sensory change (burning left foot). Negative for dizziness, tingling, tremors, seizures and loss of consciousness. Difficulty walking   Endo/Heme/Allergies: Positive for environmental allergies (takes alavert). Does not bruise/bleed easily. Psychiatric/Behavioral: Negative for depression and suicidal ideas. The patient is not nervous/anxious and does not have insomnia. All other systems reviewed and are negative. Physical Exam   Constitutional: He is oriented to person, place, and time.  He appears well-developed and well-nourished. No distress. HENT:   Head: Normocephalic. Neck: Normal range of motion. Pulmonary/Chest: Effort normal. No respiratory distress. Musculoskeletal: Normal range of motion. Left foot: There is tenderness and deformity. Neurological: He is alert and oriented to person, place, and time. Gait (antalgic) abnormal.   Skin: Skin is warm and dry. Psychiatric: He has a normal mood and affect. His behavior is normal. Judgment and thought content normal.   Nursing note and vitals reviewed. ASSESSMENT:    1. Complex regional pain syndrome type 2 of left lower extremity    2. Encounter for long-term (current) drug use    3. Chronic pain syndrome           [place] Prescription Monitoring Program was reviewed which does not demonstrate aberrancies and/or inconsistencies with regard to the historical prescribing of controlled medications to this patient by other providers. PLAN / Pt Instructions:  1. Continue current plan with no evidence of addiction or diversion. Stable on current medication without adverse events. 2. Refill Opana IR 10 mg. Taper down as follows. Take 1 tablet up to 5 times daily as needed for pain. Plan to taper down to 4 times daily next visit. 3. Refill Opana ER 30 mg every 12 hours  4. Refill gabapentin 300 mg every 8 hours. Second prescription for next month adjusted to 400 mg every 8 hours. 5. Refill Linzess 145 mcg once daily with breakfast.   6. naloxone 4 mg nasal spray for opioid induced respiratory depression emergency only. 7. Discussed risks of addiction, dependency, and opioid induced hyperalgesia. 8. Return to clinic in 1 month    Medications Ordered Today   Medications    oxyMORphone (OPANA ER) 30 mg PO     Sig: Take 1 Tab by mouth every twelve (12) hours for 30 days. Max Daily Amount: 2 Tabs.  for chronic, severe, refractory pain     Dispense:  60 Each     Refill:  0    oxyMORphone (OPANA) 10 mg tab tablet     Sig: Take 1 Tab by mouth five (5) times daily for 30 days. Max Daily Amount: 50 mg. PRN only,  For breakthrough pain. Indications: Pain     Dispense:  150 Tab     Refill:  0    gabapentin (NEURONTIN) 400 mg capsule     Sig: Take 1 Cap by mouth three (3) times daily for 30 days. Dispense:  90 Cap     Refill:  3    linaclotide (LINZESS) 145 mcg cap capsule     Sig: Take 1 Cap by mouth daily (with breakfast) for 30 days. Dispense:  30 Cap     Refill:  3       Pain medications prescribed with the objective of pain relief and improved physical and psychosocial function in this patient. Spent 25 minutes with patient today reviewing the treatment plan, goals of treatment plan, and limitations of the treatment plan, to include the potential for side effects from medications and procedures. Nava Velez 10/24/2017 1:58 PM    Note: Please excuse any typographical errors. Voice recognition software was used for this note and may cause mistakes.

## 2017-10-24 NOTE — PROGRESS NOTES
Nursing Notes    Patient presents to the office today in follow-up. Patient rates his pain at 5/10 on the numerical pain scale. Reviewed medications with counts as follows:    Rx Date filled Qty Dispensed Pill Count Last Dose Short   opana ER 30mg 09/20/17 60 0 yesterday no   opana 10mg   10/20/17 24 0 yesterday no   opana 10mg 09/18/17 180 0  no                      Pt did not bring Rx for Opana ER and Opana; counseling done and  shows:09/20/17 and 09/18/17    Comments: opana 10mg on 10/20/17 bridge     POC UDS was not performed in office today    Any new labs or imaging since last appointment? NO    Have you been to an emergency room (ER) or urgent care clinic since your last visit? NO            Have you been hospitalized since your last visit? NO     If yes, where, when, and reason for visit? Have you seen or consulted any other health care providers outside of the [address] [place] [address]  since your last visit? NO     If yes, where, when, and reason for visit? HM deferred to pcp.

## 2017-10-24 NOTE — MR AVS SNAPSHOT
Visit Information Date & Time Provider Department Dept. Phone Encounter #  
 10/24/2017  8:20 AM Zaid Tucker, [address] [place] [address] for Pain Management [phone] Follow-up Instructions Return in about 1 month (around 11/24/2017). Upcoming Health Maintenance Date Due DTaP/Tdap/Td series (1 - Tdap) 4/26/1997 INFLUENZA AGE 9 TO ADULT 8/1/2017 Allergies as of 10/24/2017  Review Complete On: 10/24/2017 By: RENETTA Benton Severity Noted Reaction Type Reactions Penicillins  04/26/2013    Other (comments) As a child ,throat swells Current Immunizations  Never Reviewed No immunizations on file. Not reviewed this visit You Were Diagnosed With   
  
 Codes Comments Complex regional pain syndrome type 2 of left lower extremity    -  Primary ICD-10-CM: G57.72 
ICD-9-CM: 355.71 Encounter for long-term (current) drug use     ICD-10-CM: Z79.899 ICD-9-CM: V58.69 Chronic pain syndrome     ICD-10-CM: G89.4 ICD-9-CM: 338. 4 Vitals BP Pulse Temp Resp Height(growth percentile) Weight(growth percentile) 119/81 72 97 °F (36.1 °C) 12 6' (1.829 m) 182 lb (82.6 kg) BMI Smoking Status 24.68 kg/m2 Former Smoker BMI and BSA Data Body Mass Index Body Surface Area  
 24.68 kg/m 2 2.05 m 2 Preferred Pharmacy Pharmacy Name Phone [facility] [phone] Your Updated Medication List  
  
   
This list is accurate as of: 10/24/17 10:02 AM.  Always use your most recent med list.  
  
  
  
  
 aspirin 325 mg tablet Commonly known as:  ASPIRIN Take 325 mg by mouth daily. * gabapentin 400 mg capsule Commonly known as:  NEURONTIN Take 400 mg by mouth three (3) times daily. * gabapentin 400 mg capsule Commonly known as:  NEURONTIN  
 Take 1 Cap by mouth three (3) times daily for 30 days. linaclotide 145 mcg Cap capsule Commonly known as:  Miky Georgi Take 1 Cap by mouth daily (with breakfast) for 30 days. naloxegol 25 mg Tab tablet Commonly known as:  Kurt Light Take 1 Tab by mouth daily. For opioid induced constipation  
  
 naloxone 4 mg/actuation nasal spray Commonly known as:  NARCAN  
4 mg by Nasal route as needed. As needed for opioid induced respiratory emergency only  Indications: OPIATE-INDUCED RESPIRATORY DEPRESSION  
  
 * oxyMORphone 30 mg  
Commonly known as:  OPANA ER Take 1 Tab by mouth every twelve (12) hours. Max Daily Amount: 2 Tabs. for chronic, severe, refractory pain * oxyMORphone 30 mg  
Commonly known as:  OPANA ER Take 1 Tab by mouth every twelve (12) hours for 30 days. Max Daily Amount: 2 Tabs. for chronic, severe, refractory pain * oxyMORphone 10 mg Tab tablet Commonly known as:  OPANA Take 1 Tab by mouth five (5) times daily for 30 days. Max Daily Amount: 50 mg. PRN only,  For breakthrough pain. Indications: Pain * Notice: This list has 5 medication(s) that are the same as other medications prescribed for you. Read the directions carefully, and ask your doctor or other care provider to review them with you. Prescriptions Printed Refills  
 oxyMORphone (OPANA ER) 30 mg PO 0 Sig: Take 1 Tab by mouth every twelve (12) hours for 30 days. Max Daily Amount: 2 Tabs. for chronic, severe, refractory pain  
 Class: Print Route: Oral  
 oxyMORphone (OPANA) 10 mg tab tablet 0 Sig: Take 1 Tab by mouth five (5) times daily for 30 days. Max Daily Amount: 50 mg. PRN only,  For breakthrough pain. Indications: Pain Class: Print Route: Oral  
  
Prescriptions Sent to Pharmacy Refills  
 gabapentin (NEURONTIN) 400 mg capsule 3 Sig: Take 1 Cap by mouth three (3) times daily for 30 days.   
 Class: Normal  
 [facility]: [facility] [address] [place] [address], [address] Ph #: [phone] Route: Oral  
 linaclotide (LINZESS) 145 mcg cap capsule 3 Sig: Take 1 Cap by mouth daily (with breakfast) for 30 days. Class: Normal  
 Pharmacy: [address] [place] [address], [address] [place] [address] Ph #: [phone] Route: Oral  
  
Follow-up Instructions Return in about 1 month (around 11/24/2017). Patient Instructions 1. Continue current plan with no evidence of addiction or diversion. Stable on current medication without adverse events. 2. Refill Opana IR 10 mg. Taper down as follows. Take 1 tablet up to 5 times daily as needed for pain. Plan to taper down to 4 times daily next visit. 3. Refill Opana ER 30 mg every 12 hours 4. Refill gabapentin 300 mg every 8 hours. Second prescription for next month adjusted to 400 mg every 8 hours. 5. Refill Linzess 145 mcg once daily with breakfast.  
6. naloxone 4 mg nasal spray for opioid induced respiratory depression emergency only. 7. Discussed risks of addiction, dependency, and opioid induced hyperalgesia. 8. Return to clinic in 1 month Introducing [facility] & HEALTH SERVICES! [facility] introduces [employer] patient portal. Now you can access parts of your medical record, email your doctor's office, and request medication refills online. 1. In your internet browser, go to [email or web address] 2. Click on the First Time User? Click Here link in the Sign In box. You will see the New Member Sign Up page. 3. Enter your [employer] Access Code exactly as it appears below. You will not need to use this code after youve completed the sign-up process. If you do not sign up before the expiration date, you must request a new code. · [employer] Access Code: [email or web address] Expires: 1/22/2018  9:04 AM 
 
4.  Enter the last four digits of your Social Security Number (xxxx) and Date of Birth (mm/dd/yyyy) as indicated and click Submit. You will be taken to the next sign-up page. 5. Create a [employer] ID. This will be your [employer] login ID and cannot be changed, so think of one that is secure and easy to remember. 6. Create a [employer] password. You can change your password at any time. 7. Enter your Password Reset Question and Answer. This can be used at a later time if you forget your password. 8. Enter your e-mail address. You will receive e-mail notification when new information is available in [address] 9. Click Sign Up. You can now view and download portions of your medical record. 10. Click the Download Summary menu link to download a portable copy of your medical information. If you have questions, please visit the Frequently Asked Questions section of the [employer] website. Remember, [employer] is NOT to be used for urgent needs. For medical emergencies, dial 911. Now available from your iPhone and Android! Please provide this summary of care documentation to your next provider. Your primary care clinician is listed as Melo Rogers. If you have any questions after today's visit, please call [phone].

## 2017-11-20 ENCOUNTER — TELEPHONE (OUTPATIENT)
Dept: PAIN MANAGEMENT | Age: 42
End: 2017-11-20

## 2017-11-20 NOTE — TELEPHONE ENCOUNTER
Patient called the office concerning an appt. I called the patient to get further information but there was no answer. I left a message for him to call us back on the nurse triage line.

## 2017-11-22 ENCOUNTER — OFFICE VISIT (OUTPATIENT)
Dept: PAIN MANAGEMENT | Age: 42
End: 2017-11-22

## 2017-11-22 VITALS
DIASTOLIC BLOOD PRESSURE: 86 MMHG | BODY MASS INDEX: 24.65 KG/M2 | HEIGHT: 72 IN | SYSTOLIC BLOOD PRESSURE: 129 MMHG | HEART RATE: 69 BPM | RESPIRATION RATE: 14 BRPM | WEIGHT: 182 LBS | TEMPERATURE: 98.2 F

## 2017-11-22 DIAGNOSIS — M79.605 PAIN OF LEFT LOWER EXTREMITY: ICD-10-CM

## 2017-11-22 DIAGNOSIS — G89.4 CHRONIC PAIN SYNDROME: ICD-10-CM

## 2017-11-22 DIAGNOSIS — G57.72 COMPLEX REGIONAL PAIN SYNDROME TYPE 2 OF LEFT LOWER EXTREMITY: ICD-10-CM

## 2017-11-22 RX ORDER — OXYMORPHONE HYDROCHLORIDE 10 MG/1
TABLET ORAL
Qty: 150 TAB | Refills: 0 | Status: SHIPPED | OUTPATIENT
Start: 2017-12-22 | End: 2018-02-22 | Stop reason: SDUPTHER

## 2017-11-22 RX ORDER — OXYMORPHONE HYDROCHLORIDE 10 MG/1
10-20 TABLET ORAL
Qty: 180 TAB | Refills: 0 | Status: SHIPPED | OUTPATIENT
Start: 2017-12-22 | End: 2017-11-22 | Stop reason: CLARIF

## 2017-11-22 RX ORDER — OXYMORPHONE HYDROCHLORIDE 10 MG/1
10-20 TABLET ORAL
Qty: 180 TAB | Refills: 0 | Status: SHIPPED | OUTPATIENT
Start: 2018-01-21 | End: 2017-11-22 | Stop reason: CLARIF

## 2017-11-22 RX ORDER — OXYMORPHONE HYDROCHLORIDE 10 MG/1
10 TABLET ORAL
Qty: 150 TAB | Refills: 0 | Status: SHIPPED | OUTPATIENT
Start: 2017-11-23 | End: 2017-12-23

## 2017-11-22 RX ORDER — OXYMORPHONE HYDROCHLORIDE 10 MG/1
TABLET ORAL
Qty: 150 TAB | Refills: 0 | Status: SHIPPED | OUTPATIENT
Start: 2018-01-21 | End: 2018-02-26 | Stop reason: SDUPTHER

## 2017-11-22 RX ORDER — OXYMORPHONE HYDROCHLORIDE 10 MG/1
10-20 TABLET ORAL
Qty: 24 TAB | Refills: 0 | Status: SHIPPED | OUTPATIENT
Start: 2017-12-22 | End: 2017-11-22 | Stop reason: CLARIF

## 2017-11-22 NOTE — PROGRESS NOTES
Nursing Notes    Patient presents to the office today in follow-up. Patient rates his pain at 6/10 on the numerical pain scale. Reviewed medications with counts as follows:    Rx Date filled Qty Dispensed Pill Count Last Dose Short   Opana ER 30 mg 10/24/17 60 0 per patient today    Opana 10 mg 10/24/17 150 2 per patient today                               Pt did not bring Rx for Opana ER 30 mg  And Opana 10 mg,  shows both were last filled on 10/24/17. Comments:     POC UDS was not performed in office today    Any new labs or imaging since last appointment? NO    Have you been to an emergency room (ER) or urgent care clinic since your last visit? NO            Have you been hospitalized since your last visit? NO     If yes, where, when, and reason for visit? Have you seen or consulted any other health care providers outside of the [address] [place] [address]  since your last visit? NO     If yes, where, when, and reason for visit? HM deferred to pcp.

## 2017-11-22 NOTE — PATIENT INSTRUCTIONS
1. Continue current plan with no evidence of addiction or diversion. Stable on current medication without adverse events. 2. Refill Opana IR 10 mg. Take 1 tablet up to 5 times daily as needed for pain. Plan to taper down to 4 times daily next visit. 3. Refill Opana ER 30 mg every 12 hours  4. Continue gabapentin 400 mg every 8 hours. 5. Continue Linzess 145 mcg once daily with breakfast.   6. naloxone 4 mg nasal spray for opioid induced respiratory depression emergency only. 7. Discussed risks of addiction, dependency, and opioid induced hyperalgesia.    8. Return to clinic in 3 months

## 2017-11-22 NOTE — MR AVS SNAPSHOT
Visit Information Date & Time Provider Department Dept. Phone Encounter #  
 11/22/2017 11:20 AM RENETTA Harris [facility] for Pain Management [phone] Follow-up Instructions Return in about 3 months (around 2/22/2018). Your Appointments 1/23/2018  1:20 PM  
Follow Up with RENETTA Harris [facility] for Pain Management (CONCEPCION SCHEDULING) Appt Note: Return in about 1 month (around 11/24/2017). ; pt re'd from 11/20/17 for 1 mon f/u. ..to  
 [address] [facility] [address]  
[phone] [address]  
  
    
 2/15/2018  2:40 PM  
Follow Up with RENETTA Harris [facility] Pain Management (CONCEPCION SCHEDULING) Appt Note: return in 3 mnths [address] [facility] [address]  
[phone] Upcoming Health Maintenance Date Due DTaP/Tdap/Td series (1 - Tdap) 4/26/1997 Influenza Age 5 to Adult 8/1/2017 Allergies as of 11/22/2017  Review Complete On: 11/22/2017 By: RENETTA Harris Severity Noted Reaction Type Reactions Penicillins  04/26/2013    Other (comments) As a child ,throat swells Current Immunizations  Never Reviewed No immunizations on file. Not reviewed this visit You Were Diagnosed With   
  
 Codes Comments Complex regional pain syndrome type 2 of left lower extremity     ICD-10-CM: G57.72 
ICD-9-CM: 355.71 Chronic pain syndrome     ICD-10-CM: G89.4 ICD-9-CM: 338.4 Pain of left lower extremity     ICD-10-CM: M79.605 ICD-9-CM: 729.5 Vitals BP Pulse Temp Resp Height(growth percentile) Weight(growth percentile) 129/86 (BP 1 Location: Right arm, BP Patient Position: Sitting) 69 98.2 °F (36.8 °C) (Oral) 14 6' (1.829 m) 182 lb (82.6 kg) BMI Smoking Status 24.68 kg/m2 Former Smoker Vitals History BMI and BSA Data Body Mass Index Body Surface Area  
 24.68 kg/m 2 2.05 m 2 Preferred Pharmacy Pharmacy Name Phone [facility], [address]. [phone] Your Updated Medication List  
  
   
This list is accurate as of: 11/22/17  1:02 PM.  Always use your most recent med list.  
  
  
  
  
 aspirin 325 mg tablet Commonly known as:  ASPIRIN Take 325 mg by mouth daily. * gabapentin 400 mg capsule Commonly known as:  NEURONTIN Take 400 mg by mouth three (3) times daily. * gabapentin 400 mg capsule Commonly known as:  NEURONTIN Take 1 Cap by mouth three (3) times daily for 30 days. linaclotide 145 mcg Cap capsule Commonly known as:  Julieta Cola Take 1 Cap by mouth daily (with breakfast) for 30 days. naloxegol 25 mg Tab tablet Commonly known as:  Jinpate Breech Take 1 Tab by mouth daily. For opioid induced constipation  
  
 naloxone 4 mg/actuation nasal spray Commonly known as:  NARCAN  
4 mg by Nasal route as needed. As needed for opioid induced respiratory emergency only  Indications: OPIATE-INDUCED RESPIRATORY DEPRESSION  
  
 * oxyMORphone 30 mg  
Commonly known as:  OPANA ER Take 1 Tab by mouth every twelve (12) hours. Max Daily Amount: 2 Tabs. for chronic, severe, refractory pain * oxyMORphone 30 mg  
Commonly known as:  OPANA ER Take 1 Tab by mouth every twelve (12) hours for 30 days. Max Daily Amount: 2 Tabs. for chronic, severe, refractory pain Start taking on:  11/23/2017 * oxyMORphone 10 mg Tab tablet Commonly known as:  OPANA Take 1 Tab by mouth five (5) times daily for 30 days. Max Daily Amount: 50 mg. PRN only,  For breakthrough pain. Indications: Pain Start taking on:  11/23/2017 * oxyMORphone 10 mg Tab tablet Commonly known as:  OPANA Take 1-2 Tabs by mouth four (4) times daily as needed for Pain for up to 30 days. Max Daily Amount: 6 Tabs. For breakthrough pain. Indications: Pain Start taking on:  12/22/2017 * oxyMORphone 30 mg  
Commonly known as:  OPANA ER Take 1 Tab by mouth every twelve (12) hours for 30 days. Max Daily Amount: 2 Tabs. for chronic, severe, refractory pain Start taking on:  12/22/2017 * oxyMORphone 30 mg  
Commonly known as:  OPANA ER Take 1 Tab by mouth every twelve (12) hours for 30 days. Max Daily Amount: 2 Tabs. for chronic, severe, refractory pain Start taking on:  1/21/2018 * oxyMORphone 10 mg Tab tablet Commonly known as:  OPANA Take 1-2 Tabs by mouth four (4) times daily as needed for Pain for up to 30 days. Max Daily Amount: 6 Tabs. For breakthrough pain. Indications: Pain Start taking on:  1/21/2018 * Notice: This list has 9 medication(s) that are the same as other medications prescribed for you. Read the directions carefully, and ask your doctor or other care provider to review them with you. Prescriptions Printed Refills  
 oxyMORphone (OPANA ER) 30 mg PO 0 Starting on: 11/23/2017 Sig: Take 1 Tab by mouth every twelve (12) hours for 30 days. Max Daily Amount: 2 Tabs. for chronic, severe, refractory pain  
 Class: Print Route: Oral  
 oxyMORphone (OPANA ER) 30 mg PO 0 Starting on: 1/21/2018 Sig: Take 1 Tab by mouth every twelve (12) hours for 30 days. Max Daily Amount: 2 Tabs. for chronic, severe, refractory pain  
 Class: Print Route: Oral  
 oxyMORphone (OPANA) 10 mg tab tablet 0 Starting on: 11/23/2017 Sig: Take 1 Tab by mouth five (5) times daily for 30 days. Max Daily Amount: 50 mg. PRN only,  For breakthrough pain. Indications: Pain Class: Print Route: Oral  
 oxyMORphone (OPANA) 10 mg tab tablet 0 Starting on: 1/21/2018 Sig: Take 1-2 Tabs by mouth four (4) times daily as needed for Pain for up to 30 days. Max Daily Amount: 6 Tabs. For breakthrough pain. Indications: Pain Class: Print Route: Oral  
 oxyMORphone (OPANA) 10 mg tab tablet 0 Starting on: 12/22/2017 Sig: Take 1-2 Tabs by mouth four (4) times daily as needed for Pain for up to 30 days. Max Daily Amount: 6 Tabs. For breakthrough pain. Indications: Pain Class: Print Route: Oral  
 oxyMORphone (OPANA ER) 30 mg PO 0 Starting on: 12/22/2017 Sig: Take 1 Tab by mouth every twelve (12) hours for 30 days. Max Daily Amount: 2 Tabs. for chronic, severe, refractory pain  
 Class: Print Route: Oral  
  
Follow-up Instructions Return in about 3 months (around 2/22/2018). Patient Instructions 1. Continue current plan with no evidence of addiction or diversion. Stable on current medication without adverse events. 2. Refill Opana IR 10 mg. Taper down as follows. Take 1 tablet up to 5 times daily as needed for pain. Plan to taper down to 4 times daily next visit. 3. Refill Opana ER 30 mg every 12 hours 4. Continue gabapentin 300 mg every 8 hours. Second prescription for next month adjusted to 400 mg every 8 hours. 5. Continue Linzess 145 mcg once daily with breakfast.  
6. naloxone 4 mg nasal spray for opioid induced respiratory depression emergency only. 7. Discussed risks of addiction, dependency, and opioid induced hyperalgesia. 8. Return to clinic in 3 months Introducing [facility] & HEALTH SERVICES! [employer] introduces [employer] patient portal. Now you can access parts of your medical record, email your doctor's office, and request medication refills online. 1. In your internet browser, go to [email or web address] 2. Click on the First Time User? Click Here link in the Sign In box. You will see the New Member Sign Up page. 3. Enter your [employer] Access Code exactly as it appears below. You will not need to use this code after youve completed the sign-up process. If you do not sign up before the expiration date, you must request a new code. · doo Access Code: [email or web address] Expires: 1/22/2018  8:04 AM 
 
4. Enter the last four digits of your Social Security Number (xxxx) and Date of Birth (mm/dd/yyyy) as indicated and click Submit. You will be taken to the next sign-up page. 5. Create a doo ID. This will be your doo login ID and cannot be changed, so think of one that is secure and easy to remember. 6. Create a doo password. You can change your password at any time. 7. Enter your Password Reset Question and Answer. This can be used at a later time if you forget your password. 8. Enter your e-mail address. You will receive e-mail notification when new information is available in [address] 9. Click Sign Up. You can now view and download portions of your medical record. 10. Click the Download Summary menu link to download a portable copy of your medical information. If you have questions, please visit the Frequently Asked Questions section of the doo website. Remember, doo is NOT to be used for urgent needs. For medical emergencies, dial 911. Now available from your iPhone and Android! Please provide this summary of care documentation to your next provider. Your primary care clinician is listed as Cally Shelby. If you have any questions after today's visit, please call [phone].
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
## 2018-02-21 ENCOUNTER — TELEPHONE (OUTPATIENT)
Dept: PAIN MANAGEMENT | Age: 43
End: 2018-02-21

## 2018-02-21 NOTE — TELEPHONE ENCOUNTER
Received call from patient stating that he is about to run out of medications; patient no showed appt on 01/23/18, and missed appt on 02/15/18 ; please advise.

## 2018-02-22 DIAGNOSIS — G89.4 CHRONIC PAIN SYNDROME: Primary | ICD-10-CM

## 2018-02-22 RX ORDER — OXYMORPHONE HYDROCHLORIDE 10 MG/1
TABLET ORAL
Qty: 25 TAB | Refills: 0 | Status: SHIPPED | OUTPATIENT
Start: 2018-02-22 | End: 2018-02-26 | Stop reason: SDUPTHER

## 2018-02-22 NOTE — TELEPHONE ENCOUNTER
Please advise patient he may  prescriptions from the office to last until his appointment on Tuesday.  (Routing comment) /rc

## 2018-02-22 NOTE — TELEPHONE ENCOUNTER
Patient rescheduled to Monday 02/26/18 at [phone]; patient is now requesting medications to last until that appt as he states he ran out yesterday.

## 2018-02-26 ENCOUNTER — OFFICE VISIT (OUTPATIENT)
Dept: PAIN MANAGEMENT | Age: 43
End: 2018-02-26

## 2018-02-26 VITALS
WEIGHT: 182 LBS | HEART RATE: 82 BPM | RESPIRATION RATE: 18 BRPM | TEMPERATURE: 98.6 F | BODY MASS INDEX: 24.65 KG/M2 | HEIGHT: 72 IN

## 2018-02-26 DIAGNOSIS — Z79.899 ENCOUNTER FOR LONG-TERM (CURRENT) USE OF HIGH-RISK MEDICATION: Primary | ICD-10-CM

## 2018-02-26 DIAGNOSIS — G57.72 COMPLEX REGIONAL PAIN SYNDROME TYPE 2 OF LEFT LOWER EXTREMITY: ICD-10-CM

## 2018-02-26 DIAGNOSIS — G89.4 CHRONIC PAIN SYNDROME: ICD-10-CM

## 2018-02-26 RX ORDER — OXYMORPHONE HYDROCHLORIDE 10 MG/1
TABLET ORAL
Qty: 150 TAB | Refills: 0 | Status: SHIPPED | OUTPATIENT
Start: 2018-02-26 | End: 2022-07-16

## 2018-02-26 RX ORDER — OXYMORPHONE HYDROCHLORIDE 10 MG/1
TABLET ORAL
Qty: 150 TAB | Refills: 0 | Status: SHIPPED | OUTPATIENT
Start: 2018-03-27 | End: 2022-07-16

## 2018-02-26 RX ORDER — GABAPENTIN 400 MG/1
400 CAPSULE ORAL 3 TIMES DAILY
Qty: 90 CAP | Refills: 5 | Status: SHIPPED | OUTPATIENT
Start: 2018-02-26 | End: 2018-03-28

## 2018-02-26 RX ORDER — OXYMORPHONE HYDROCHLORIDE 10 MG/1
10-20 TABLET ORAL
Qty: 180 TAB | Refills: 0 | Status: SHIPPED | OUTPATIENT
Start: 2018-04-26 | End: 2018-05-26

## 2018-02-26 NOTE — PATIENT INSTRUCTIONS
1. Continue current plan with no evidence of addiction or diversion. Stable on current medication without adverse events. 2. Refill Opana IR 10 mg. Take 1 tablet up to 5 times daily as needed for pain. Plan to taper down to 4 times daily next visit. 3. Refill Opana ER 30 mg every 12 hours. 5 refills  4. Refill gabapentin 400 mg every 8 hours. 5 refills  5. Refill Linzess 145 mcg once daily with breakfast.   6. naloxone 4 mg nasal spray for opioid induced respiratory depression emergency only. 7. Discussed risks of addiction, dependency, and opioid induced hyperalgesia.    8. Return to clinic in 3 months

## 2018-02-26 NOTE — MR AVS SNAPSHOT
[address] [address] 
[phone] Patient: Joel Munoz MRN: [id number] :1975 Visit Information Date & Time Provider Department Dept. Phone Encounter #  
 2018  7:30 AM Adolfo Alcantar, [facility] [phone] Follow-up Instructions Return in about 3 months (around 2018). Upcoming Health Maintenance Date Due DTaP/Tdap/Td series (1 - Tdap) 1997 Influenza Age 5 to Adult 2017 Allergies as of 2018  Review Complete On: 2018 By: RENETTA Reyes Severity Noted Reaction Type Reactions Penicillins  2013    Other (comments) As a child ,throat swells Current Immunizations  Never Reviewed No immunizations on file. Not reviewed this visit You Were Diagnosed With   
  
 Codes Comments Encounter for long-term (current) use of high-risk medication    -  Primary ICD-10-CM: I83.409 ICD-9-CM: V58.69 Chronic pain syndrome     ICD-10-CM: G89.4 ICD-9-CM: 338.4 Complex regional pain syndrome type 2 of left lower extremity     ICD-10-CM: G57.72 
ICD-9-CM: 355.71 Vitals Pulse Temp Resp Height(growth percentile) Weight(growth percentile) BMI  
 82 98.6 °F (37 °C) (Oral) 18 6' (1.829 m) 182 lb (82.6 kg) 24.68 kg/m2 Smoking Status Former Smoker Vitals History BMI and BSA Data Body Mass Index Body Surface Area  
 24.68 kg/m 2 2.05 m 2 Preferred Pharmacy Pharmacy Name Phone [facility], [address] [phone] Your Updated Medication List  
  
   
This list is accurate as of 18  8:05 AM.  Always use your most recent med list.  
  
  
  
  
 aspirin 325 mg tablet Commonly known as:  ASPIRIN Take 325 mg by mouth daily. * gabapentin 400 mg capsule Commonly known as:  NEURONTIN Take 400 mg by mouth three (3) times daily. * gabapentin 400 mg capsule Commonly known as:  NEURONTIN Take 1 Cap by mouth three (3) times daily for 30 days. linaclotide 145 mcg Cap capsule Commonly known as:  Karlis Diones Take 1 Cap by mouth daily (with breakfast) for 30 days. naloxegol 25 mg Tab tablet Commonly known as:  Vicie Valencia Take 1 Tab by mouth daily. For opioid induced constipation  
  
 naloxone 4 mg/actuation nasal spray Commonly known as:  NARCAN  
4 mg by Nasal route as needed. As needed for opioid induced respiratory emergency only  Indications: OPIATE-INDUCED RESPIRATORY DEPRESSION  
  
 * oxyMORphone 30 mg  
Commonly known as:  OPANA ER Take 1 Tab by mouth every twelve (12) hours for 30 days. Max Daily Amount: 2 Tabs. for chronic, severe, refractory pain * oxyMORphone 10 mg Tab tablet Commonly known as:  OPANA Take 1 tablet up to 5 times daily as needed for breakthrough pain. Indications: Pain * oxyMORphone 30 mg  
Commonly known as:  OPANA ER Take 1 Tab by mouth every twelve (12) hours for 30 days. Max Daily Amount: 2 Tabs. for chronic, severe, refractory pain Start taking on:  3/27/2018 * oxyMORphone 10 mg Tab tablet Commonly known as:  OPANA Take 1 tablet up to 5 times daily as needed for breakthrough pain. Indications: Pain Start taking on:  3/27/2018 * oxyMORphone 30 mg  
Commonly known as:  OPANA ER Take 1 Tab by mouth every twelve (12) hours for 30 days. Max Daily Amount: 2 Tabs. for chronic, severe, refractory pain Start taking on:  4/26/2018 * oxyMORphone 10 mg Tab tablet Commonly known as:  OPANA Take 1-2 Tabs by mouth four (4) times daily as needed for Pain for up to 30 days. Max Daily Amount: 6 Tabs. For breakthrough pain. Indications: Pain Start taking on:  4/26/2018 * Notice:   This list has 8 medication(s) that are the same as other medications prescribed for you. Read the directions carefully, and ask your doctor or other care provider to review them with you. Prescriptions Printed Refills  
 oxyMORphone (OPANA ER) 30 mg PO 0 Sig: Take 1 Tab by mouth every twelve (12) hours for 30 days. Max Daily Amount: 2 Tabs. for chronic, severe, refractory pain  
 Class: Print Route: Oral  
 oxyMORphone (OPANA ER) 30 mg PO 0 Starting on: 3/27/2018 Sig: Take 1 Tab by mouth every twelve (12) hours for 30 days. Max Daily Amount: 2 Tabs. for chronic, severe, refractory pain  
 Class: Print Route: Oral  
 oxyMORphone (OPANA ER) 30 mg PO 0 Starting on: 4/26/2018 Sig: Take 1 Tab by mouth every twelve (12) hours for 30 days. Max Daily Amount: 2 Tabs. for chronic, severe, refractory pain  
 Class: Print Route: Oral  
 oxyMORphone (OPANA) 10 mg tab tablet 0 Sig: Take 1 tablet up to 5 times daily as needed for breakthrough pain. Indications: Pain Class: Print  
 oxyMORphone (OPANA) 10 mg tab tablet 0 Starting on: 3/27/2018 Sig: Take 1 tablet up to 5 times daily as needed for breakthrough pain. Indications: Pain Class: Print  
 oxyMORphone (OPANA) 10 mg tab tablet 0 Starting on: 4/26/2018 Sig: Take 1-2 Tabs by mouth four (4) times daily as needed for Pain for up to 30 days. Max Daily Amount: 6 Tabs. For breakthrough pain. Indications: Pain Class: Print Route: Oral  
  
Prescriptions Sent to Pharmacy Refills  
 linaclotide (LINZESS) 145 mcg cap capsule 5 Sig: Take 1 Cap by mouth daily (with breakfast) for 30 days. Class: Normal  
 Pharmacy: [address] [place] [address], [address] [place] [address] Ph #: [phone] Route: Oral  
 gabapentin (NEURONTIN) 400 mg capsule 5 Sig: Take 1 Cap by mouth three (3) times daily for 30 days. Class: Normal  
 Pharmacy: [address] [place] [address], [address] [place] [address] Ph #: [phone] Route: Oral  
  
We Performed the Following AMB POC DRUG SCREEN () [ [facility]] DRUG SCREEN [[id number] Custom] Follow-up Instructions Return in about 3 months (around 5/26/2018). Patient Instructions 1. Continue current plan with no evidence of addiction or diversion. Stable on current medication without adverse events. 2. Refill Opana IR 10 mg. Take 1 tablet up to 5 times daily as needed for pain. Plan to taper down to 4 times daily next visit. 3. Refill Opana ER 30 mg every 12 hours. 5 refills 4. Refill gabapentin 400 mg every 8 hours. 5 refills 5. Refill Linzess 145 mcg once daily with breakfast.  
6. naloxone 4 mg nasal spray for opioid induced respiratory depression emergency only. 7. Discussed risks of addiction, dependency, and opioid induced hyperalgesia. 8. Return to clinic in 3 months Introducing [facility]! Gracie Mansfield introduces [employer] patient portal. Now you can access parts of your medical record, email your doctor's office, and request medication refills online. 1. In your internet browser, go to [email or web address] 2. Click on the First Time User? Click Here link in the Sign In box. You will see the New Member Sign Up page. 3. Enter your [employer] Access Code exactly as it appears below. You will not need to use this code after youve completed the sign-up process. If you do not sign up before the expiration date, you must request a new code. · [employer] Access Code: [email or web address] Expires: 5/27/2018  8:05 AM 
 
4. Enter the last four digits of your Social Security Number (xxxx) and Date of Birth (mm/dd/yyyy) as indicated and click Submit. You will be taken to the next sign-up page. 5. Create a [employer] ID. This will be your [employer] login ID and cannot be changed, so think of one that is secure and easy to remember. 6. Create a [employer] password. You can change your password at any time. 7. Enter your Password Reset Question and Answer. This can be used at a later time if you forget your password. 8. Enter your e-mail address. You will receive e-mail notification when new information is available in [address] 9. Click Sign Up. You can now view and download portions of your medical record. 10. Click the Download Summary menu link to download a portable copy of your medical information. If you have questions, please visit the Frequently Asked Questions section of the [facility] website. Remember, [facility] is NOT to be used for urgent needs. For medical emergencies, dial 911. Now available from your iPhone and Android! Please provide this summary of care documentation to your next provider. Your primary care clinician is listed as [facility]. If you have any questions after today's visit, please call [phone].

## 2018-02-26 NOTE — PROGRESS NOTES
Nursing Notes    Patient presents to the office today in follow-up. Patient rates his pain at 5/10 on the numerical pain scale. Reviewed medications with counts as follows:    Rx Date filled Qty Dispensed Pill Count Last Dose Short   Oxymorphone 30 mg 02/23/18 10 3 today no   Oxymorphone 10 mg 02/23/18 25 4 today no                                POC UDS was performed in office today    Any new labs or imaging since last appointment? NO    Have you been to an emergency room (ER) or urgent care clinic since your last visit? NO            Have you been hospitalized since your last visit? NO     If yes, where, when, and reason for visit? Have you seen or consulted any other health care providers outside of the [facility]  since your last visit? NO     If yes, where, when, and reason for visit? HM deferred to pcp.

## 2018-03-01 ENCOUNTER — TELEPHONE (OUTPATIENT)
Dept: PAIN MANAGEMENT | Age: 43
End: 2018-03-01

## 2018-03-16 ENCOUNTER — TELEPHONE (OUTPATIENT)
Dept: PAIN MANAGEMENT | Age: 43
End: 2018-03-16

## 2018-03-16 NOTE — TELEPHONE ENCOUNTER
Received notice that patient needed a PA for his Linzess, however in [place], it has the patient as self pay/sliding fee. I attempted to call the patient to see what type of insurance they have, so I can start the process, but had to leave a voicemail for the patient to call me back.

## 2018-03-22 ENCOUNTER — TELEPHONE (OUTPATIENT)
Dept: PAIN MANAGEMENT | Age: 43
End: 2018-03-22

## 2018-03-22 NOTE — TELEPHONE ENCOUNTER
Attempted to reach patient again today in regards to him needing a PA for his Linzess, but  unsure what insurance the patient has, if any. The phone went straight to voicemail, and left a message for the patient to call me back.

## 2018-04-06 ENCOUNTER — TELEPHONE (OUTPATIENT)
Dept: PAIN MANAGEMENT | Age: 43
End: 2018-04-06

## 2018-04-06 NOTE — TELEPHONE ENCOUNTER
Nishi informed me that the patient called and left a message on the [employer] line. I attempted to call the patient back,but once again, the phone went straight to voicemail, and had to leave a message for the patient to call me back. This is my 3rd attempt of trying to reach the patient.

## 2024-08-14 ENCOUNTER — HOSPITAL ENCOUNTER (EMERGENCY)
Facility: HOSPITAL | Age: 49
Discharge: HOME | End: 2024-08-14
Attending: EMERGENCY MEDICINE
Payer: MEDICAID

## 2024-08-14 VITALS
OXYGEN SATURATION: 96 % | DIASTOLIC BLOOD PRESSURE: 85 MMHG | SYSTOLIC BLOOD PRESSURE: 119 MMHG | HEIGHT: 72 IN | WEIGHT: 170 LBS | RESPIRATION RATE: 14 BRPM | HEART RATE: 82 BPM | BODY MASS INDEX: 23.03 KG/M2 | TEMPERATURE: 98.6 F

## 2024-08-14 DIAGNOSIS — E87.6 HYPOKALEMIA: ICD-10-CM

## 2024-08-14 DIAGNOSIS — R11.2 NAUSEA AND VOMITING, UNSPECIFIED VOMITING TYPE: Primary | ICD-10-CM

## 2024-08-14 LAB
ALBUMIN SERPL-MCNC: 4.2 G/DL (ref 3.4–5)
ALBUMIN/GLOB SERPL: 1.2 (ref 0.8–1.7)
ALP SERPL-CCNC: 117 U/L (ref 45–117)
ALT SERPL-CCNC: 18 U/L (ref 16–61)
ANION GAP SERPL CALC-SCNC: 12 MMOL/L (ref 3–18)
AST SERPL-CCNC: 18 U/L (ref 10–38)
BASOPHILS # BLD: 0 K/UL (ref 0–0.1)
BASOPHILS NFR BLD: 0 % (ref 0–2)
BILIRUB SERPL-MCNC: 0.9 MG/DL (ref 0.2–1)
BUN SERPL-MCNC: 23 MG/DL (ref 7–18)
BUN/CREAT SERPL: 17 (ref 12–20)
CALCIUM SERPL-MCNC: 9.3 MG/DL (ref 8.5–10.1)
CHLORIDE SERPL-SCNC: 104 MMOL/L (ref 100–111)
CO2 SERPL-SCNC: 22 MMOL/L (ref 21–32)
CREAT SERPL-MCNC: 1.35 MG/DL (ref 0.6–1.3)
DIFFERENTIAL METHOD BLD: ABNORMAL
EOSINOPHIL # BLD: 0 K/UL (ref 0–0.4)
EOSINOPHIL NFR BLD: 0 % (ref 0–5)
ERYTHROCYTE [DISTWIDTH] IN BLOOD BY AUTOMATED COUNT: 13.1 % (ref 11.6–14.5)
GLOBULIN SER CALC-MCNC: 3.5 G/DL (ref 2–4)
GLUCOSE SERPL-MCNC: 67 MG/DL (ref 74–99)
HCT VFR BLD AUTO: 43 % (ref 36–48)
HGB BLD-MCNC: 15.8 G/DL (ref 13–16)
IMM GRANULOCYTES # BLD AUTO: 0 K/UL (ref 0–0.04)
IMM GRANULOCYTES NFR BLD AUTO: 0 % (ref 0–0.5)
LIPASE SERPL-CCNC: 17 U/L (ref 13–75)
LYMPHOCYTES # BLD: 2 K/UL (ref 0.9–3.6)
LYMPHOCYTES NFR BLD: 55 % (ref 21–52)
MCH RBC QN AUTO: 31.7 PG (ref 24–34)
MCHC RBC AUTO-ENTMCNC: 36.7 G/DL (ref 31–37)
MCV RBC AUTO: 86.3 FL (ref 78–100)
MONOCYTES # BLD: 0.1 K/UL (ref 0.05–1.2)
MONOCYTES NFR BLD: 4 % (ref 3–10)
NEUTS SEG # BLD: 1.5 K/UL (ref 1.8–8)
NEUTS SEG NFR BLD: 41 % (ref 40–73)
NRBC # BLD: 0 K/UL (ref 0–0.01)
NRBC BLD-RTO: 0 PER 100 WBC
PLATELET # BLD AUTO: 270 K/UL (ref 135–420)
PMV BLD AUTO: 9.6 FL (ref 9.2–11.8)
POTASSIUM SERPL-SCNC: 3.3 MMOL/L (ref 3.5–5.5)
PROT SERPL-MCNC: 7.7 G/DL (ref 6.4–8.2)
RBC # BLD AUTO: 4.98 M/UL (ref 4.35–5.65)
SODIUM SERPL-SCNC: 138 MMOL/L (ref 136–145)
WBC # BLD AUTO: 3.7 K/UL (ref 4.6–13.2)

## 2024-08-14 PROCEDURE — 80053 COMPREHEN METABOLIC PANEL: CPT

## 2024-08-14 PROCEDURE — 85025 COMPLETE CBC W/AUTO DIFF WBC: CPT

## 2024-08-14 PROCEDURE — 6370000000 HC RX 637 (ALT 250 FOR IP): Performed by: EMERGENCY MEDICINE

## 2024-08-14 PROCEDURE — 83690 ASSAY OF LIPASE: CPT

## 2024-08-14 PROCEDURE — 99283 EMERGENCY DEPT VISIT LOW MDM: CPT

## 2024-08-14 RX ORDER — POTASSIUM CHLORIDE 20 MEQ/1
40 TABLET, EXTENDED RELEASE ORAL
Status: COMPLETED | OUTPATIENT
Start: 2024-08-14 | End: 2024-08-14

## 2024-08-14 RX ORDER — ONDANSETRON 4 MG/1
4 TABLET, ORALLY DISINTEGRATING ORAL 3 TIMES DAILY PRN
Qty: 21 TABLET | Refills: 0 | Status: SHIPPED | OUTPATIENT
Start: 2024-08-14

## 2024-08-14 RX ADMIN — POTASSIUM CHLORIDE 40 MEQ: 1500 TABLET, EXTENDED RELEASE ORAL at 02:51

## 2024-08-14 NOTE — ED PROVIDER NOTES
[facility] EMERGENCY DEPT  EMERGENCY DEPARTMENT ENCOUNTER    Patient Name: Jero Wooten  MRN: [id number]  YOB: 1975  Provider: Juan David Haider DO  PCP: Jason Masterson MD   Time/Date of evaluation: 2:29 AM EDT on 8/14/24    History of Presenting Illness     History Provided by: Patient and Mother  History is limited by: Nothing     HISTORY:   Jero Wooten is a 49 y.o. male with history of bipolar disorder, schizophrenia brought in by EMS and escorted by his mom with a chief complaint of vomiting.  Patient's mother states that he experienced several episodes of nonbloody, nonbilious vomiting approximately 1 hour prior to arrival.  She reports the patient is not taking any medications for his schizophrenia.  Patient denies any alcohol use, illicit drug use, chest pain, shortness of breath, headache, sore throat, back pain, flank pain, constipation, diarrhea, melena, bright red rectal bleeding, rash, dysuria, or hematuria.    Nursing Notes were all reviewed and agreed with or any disagreements were addressed in the HPI.    Past History     PAST MEDICAL HISTORY:  Past Medical History:   Diagnosis Date    Methadone dependence (HCC)     Vision decreased        PAST SURGICAL HISTORY:  Past Surgical History:   Procedure Laterality Date    APPENDECTOMY         FAMILY HISTORY:  Family History   Problem Relation Age of Onset    Hypertension Paternal Grandfather     Stroke Paternal Grandmother     Hypertension Paternal Grandmother     Diabetes Paternal Grandmother     Cancer Maternal Grandfather        SOCIAL HISTORY:  Social History     Tobacco Use    Smoking status: Former    Smokeless tobacco: Current    Tobacco comments:     Quit smoking: pt quit smoking 3 years ago, uses vapor without nicotine   Substance Use Topics    Alcohol use: Yes    Drug use: Yes     Types: Heroin, Marijuana (Weed)       MEDICATIONS:  No current facility-administered medications for this encounter.     Current Outpatient

## 2024-08-14 NOTE — ED TRIAGE NOTES
Pt arrive via EMS from home with c/o N/V.    Pt takes 120mg of Methadone daily. Has not missed any doses.    x2 doses Zofran and NS bolus en route.    BG 82 en route
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
